# Patient Record
Sex: FEMALE | Race: WHITE | NOT HISPANIC OR LATINO | Employment: UNEMPLOYED | URBAN - METROPOLITAN AREA
[De-identification: names, ages, dates, MRNs, and addresses within clinical notes are randomized per-mention and may not be internally consistent; named-entity substitution may affect disease eponyms.]

---

## 2021-04-26 ENCOUNTER — TELEPHONE (OUTPATIENT)
Dept: OBSTETRICS AND GYNECOLOGY | Facility: CLINIC | Age: 61
End: 2021-04-26

## 2021-05-12 ENCOUNTER — OFFICE VISIT (OUTPATIENT)
Dept: PRIMARY CARE CLINIC | Facility: CLINIC | Age: 61
End: 2021-05-12
Payer: MEDICAID

## 2021-05-12 VITALS
HEIGHT: 64 IN | WEIGHT: 196.25 LBS | HEART RATE: 62 BPM | DIASTOLIC BLOOD PRESSURE: 88 MMHG | SYSTOLIC BLOOD PRESSURE: 128 MMHG | OXYGEN SATURATION: 99 % | BODY MASS INDEX: 33.51 KG/M2 | TEMPERATURE: 98 F

## 2021-05-12 DIAGNOSIS — K21.9 GASTROESOPHAGEAL REFLUX DISEASE, UNSPECIFIED WHETHER ESOPHAGITIS PRESENT: ICD-10-CM

## 2021-05-12 DIAGNOSIS — H91.93 BILATERAL HEARING LOSS, UNSPECIFIED HEARING LOSS TYPE: ICD-10-CM

## 2021-05-12 DIAGNOSIS — Z12.39 ENCOUNTER FOR SCREENING FOR MALIGNANT NEOPLASM OF BREAST, UNSPECIFIED SCREENING MODALITY: ICD-10-CM

## 2021-05-12 DIAGNOSIS — R09.81 SINUS CONGESTION: Primary | ICD-10-CM

## 2021-05-12 PROCEDURE — 99203 PR OFFICE/OUTPT VISIT, NEW, LEVL III, 30-44 MIN: ICD-10-PCS | Mod: S$GLB,,, | Performed by: INTERNAL MEDICINE

## 2021-05-12 PROCEDURE — 99203 OFFICE O/P NEW LOW 30 MIN: CPT | Mod: S$GLB,,, | Performed by: INTERNAL MEDICINE

## 2021-05-12 RX ORDER — AMOXICILLIN AND CLAVULANATE POTASSIUM 875; 125 MG/1; MG/1
1 TABLET, FILM COATED ORAL EVERY 12 HOURS
Qty: 14 TABLET | Refills: 0 | Status: SHIPPED | OUTPATIENT
Start: 2021-05-12 | End: 2021-05-19

## 2021-05-12 RX ORDER — FLUTICASONE PROPIONATE 50 MCG
1 SPRAY, SUSPENSION (ML) NASAL DAILY
Qty: 16 G | Refills: 0 | Status: SHIPPED | OUTPATIENT
Start: 2021-05-12 | End: 2021-06-09

## 2021-05-12 RX ORDER — HYDROCORTISONE AND ACETIC ACID 20.75; 10.375 MG/ML; MG/ML
5 SOLUTION AURICULAR (OTIC) 2 TIMES DAILY
Qty: 10 ML | Refills: 0 | Status: SHIPPED | OUTPATIENT
Start: 2021-05-12 | End: 2021-05-22

## 2021-05-12 RX ORDER — OMEPRAZOLE 40 MG/1
40 CAPSULE, DELAYED RELEASE ORAL DAILY
Qty: 30 CAPSULE | Refills: 2 | Status: SHIPPED | OUTPATIENT
Start: 2021-05-12 | End: 2021-08-10

## 2021-05-27 ENCOUNTER — OFFICE VISIT (OUTPATIENT)
Dept: PRIMARY CARE CLINIC | Facility: CLINIC | Age: 61
End: 2021-05-27
Payer: MEDICAID

## 2021-05-27 VITALS
DIASTOLIC BLOOD PRESSURE: 88 MMHG | BODY MASS INDEX: 33.51 KG/M2 | SYSTOLIC BLOOD PRESSURE: 130 MMHG | OXYGEN SATURATION: 96 % | TEMPERATURE: 99 F | HEIGHT: 64 IN | WEIGHT: 196.25 LBS | HEART RATE: 76 BPM

## 2021-05-27 DIAGNOSIS — Z78.0 POST-MENOPAUSAL: ICD-10-CM

## 2021-05-27 DIAGNOSIS — E78.5 HYPERLIPIDEMIA, UNSPECIFIED HYPERLIPIDEMIA TYPE: Primary | ICD-10-CM

## 2021-05-27 DIAGNOSIS — Z12.11 SCREENING FOR COLON CANCER: ICD-10-CM

## 2021-05-27 DIAGNOSIS — Z00.00 WELLNESS EXAMINATION: ICD-10-CM

## 2021-05-27 PROCEDURE — 99214 OFFICE O/P EST MOD 30 MIN: CPT | Mod: S$GLB,,, | Performed by: INTERNAL MEDICINE

## 2021-05-27 PROCEDURE — 99214 PR OFFICE/OUTPT VISIT, EST, LEVL IV, 30-39 MIN: ICD-10-PCS | Mod: S$GLB,,, | Performed by: INTERNAL MEDICINE

## 2021-05-28 LAB
BUN SERPL-MCNC: 13 MG/DL (ref 7–25)
BUN/CREAT SERPL: NORMAL (CALC) (ref 6–22)
CALCIUM SERPL-MCNC: 9.6 MG/DL (ref 8.6–10.4)
CHLORIDE SERPL-SCNC: 100 MMOL/L (ref 98–110)
CHOLEST SERPL-MCNC: 254 MG/DL
CHOLEST/HDLC SERPL: 5.5 (CALC)
CO2 SERPL-SCNC: 28 MMOL/L (ref 20–32)
CREAT SERPL-MCNC: 0.95 MG/DL (ref 0.5–0.99)
GLUCOSE SERPL-MCNC: 92 MG/DL (ref 65–99)
HDLC SERPL-MCNC: 46 MG/DL
LDLC SERPL CALC-MCNC: 176 MG/DL (CALC)
NONHDLC SERPL-MCNC: 208 MG/DL (CALC)
POTASSIUM SERPL-SCNC: 4.9 MMOL/L (ref 3.5–5.3)
SODIUM SERPL-SCNC: 141 MMOL/L (ref 135–146)
TRIGL SERPL-MCNC: 168 MG/DL

## 2021-05-31 RX ORDER — ATORVASTATIN CALCIUM 40 MG/1
40 TABLET, FILM COATED ORAL DAILY
Qty: 90 TABLET | Refills: 3 | Status: SHIPPED | OUTPATIENT
Start: 2021-05-31 | End: 2022-05-31

## 2021-07-07 ENCOUNTER — TELEPHONE (OUTPATIENT)
Dept: PRIMARY CARE CLINIC | Facility: CLINIC | Age: 61
End: 2021-07-07

## 2021-07-20 ENCOUNTER — TELEPHONE (OUTPATIENT)
Dept: FAMILY MEDICINE | Facility: CLINIC | Age: 61
End: 2021-07-20

## 2021-07-27 ENCOUNTER — TELEPHONE (OUTPATIENT)
Dept: PRIMARY CARE CLINIC | Facility: CLINIC | Age: 61
End: 2021-07-27

## 2021-10-20 LAB — BCS RECOMMENDATION EXT: NORMAL

## 2022-03-08 ENCOUNTER — PATIENT OUTREACH (OUTPATIENT)
Dept: ADMINISTRATIVE | Facility: HOSPITAL | Age: 62
End: 2022-03-08
Payer: MEDICAID

## 2024-06-22 ENCOUNTER — HOSPITAL ENCOUNTER (INPATIENT)
Facility: HOSPITAL | Age: 64
LOS: 3 days | Discharge: HOME | DRG: 328 | End: 2024-06-25
Attending: EMERGENCY MEDICINE | Admitting: SURGERY
Payer: MEDICARE

## 2024-06-22 DIAGNOSIS — K31.89 GASTRIC VOLVULUS: Primary | ICD-10-CM

## 2024-06-22 DIAGNOSIS — Z01.818 PREOPERATIVE CLEARANCE: ICD-10-CM

## 2024-06-22 DIAGNOSIS — K44.9 HIATAL HERNIA: ICD-10-CM

## 2024-06-22 LAB
ABORH RETYPE: NORMAL
ALBUMIN SERPL-MCNC: 3.4 G/DL (ref 3.4–4.8)
ALBUMIN/GLOB SERPL: 1 RATIO (ref 1.1–2)
ALP SERPL-CCNC: 73 UNIT/L (ref 40–150)
ALT SERPL-CCNC: 10 UNIT/L (ref 0–55)
ANION GAP SERPL CALC-SCNC: 9 MEQ/L
APTT PPP: 24.3 SECONDS (ref 23.2–33.7)
AST SERPL-CCNC: 14 UNIT/L (ref 5–34)
BASOPHILS # BLD AUTO: 0.02 X10(3)/MCL
BASOPHILS NFR BLD AUTO: 0.3 %
BILIRUB SERPL-MCNC: 0.4 MG/DL
BUN SERPL-MCNC: 11.2 MG/DL (ref 9.8–20.1)
CALCIUM SERPL-MCNC: 8.3 MG/DL (ref 8.4–10.2)
CHLORIDE SERPL-SCNC: 106 MMOL/L (ref 98–107)
CO2 SERPL-SCNC: 22 MMOL/L (ref 23–31)
CREAT SERPL-MCNC: 0.86 MG/DL (ref 0.55–1.02)
CREAT/UREA NIT SERPL: 13
EOSINOPHIL # BLD AUTO: 0.16 X10(3)/MCL (ref 0–0.9)
EOSINOPHIL NFR BLD AUTO: 2.1 %
ERYTHROCYTE [DISTWIDTH] IN BLOOD BY AUTOMATED COUNT: 13.6 % (ref 11.5–17)
GFR SERPLBLD CREATININE-BSD FMLA CKD-EPI: >60 ML/MIN/1.73/M2
GLOBULIN SER-MCNC: 3.5 GM/DL (ref 2.4–3.5)
GLUCOSE SERPL-MCNC: 90 MG/DL (ref 82–115)
GROUP & RH: NORMAL
HCT VFR BLD AUTO: 40 % (ref 37–47)
HGB BLD-MCNC: 13 G/DL (ref 12–16)
IMM GRANULOCYTES # BLD AUTO: 0.02 X10(3)/MCL (ref 0–0.04)
IMM GRANULOCYTES NFR BLD AUTO: 0.3 %
INDIRECT COOMBS: NORMAL
INR PPP: 1.1
LACTATE SERPL-SCNC: 0.9 MMOL/L (ref 0.5–2.2)
LYMPHOCYTES # BLD AUTO: 2.11 X10(3)/MCL (ref 0.6–4.6)
LYMPHOCYTES NFR BLD AUTO: 28 %
MCH RBC QN AUTO: 27.9 PG (ref 27–31)
MCHC RBC AUTO-ENTMCNC: 32.5 G/DL (ref 33–36)
MCV RBC AUTO: 85.8 FL (ref 80–94)
MONOCYTES # BLD AUTO: 0.51 X10(3)/MCL (ref 0.1–1.3)
MONOCYTES NFR BLD AUTO: 6.8 %
NEUTROPHILS # BLD AUTO: 4.71 X10(3)/MCL (ref 2.1–9.2)
NEUTROPHILS NFR BLD AUTO: 62.5 %
NRBC BLD AUTO-RTO: 0 %
PLATELET # BLD AUTO: 330 X10(3)/MCL (ref 130–400)
PMV BLD AUTO: 10.4 FL (ref 7.4–10.4)
POTASSIUM SERPL-SCNC: 4.5 MMOL/L (ref 3.5–5.1)
PROT SERPL-MCNC: 6.9 GM/DL (ref 5.8–7.6)
PROTHROMBIN TIME: 13.8 SECONDS (ref 12.5–14.5)
RBC # BLD AUTO: 4.66 X10(6)/MCL (ref 4.2–5.4)
SODIUM SERPL-SCNC: 137 MMOL/L (ref 136–145)
SPECIMEN OUTDATE: NORMAL
WBC # BLD AUTO: 7.53 X10(3)/MCL (ref 4.5–11.5)

## 2024-06-22 PROCEDURE — 85025 COMPLETE CBC W/AUTO DIFF WBC: CPT | Performed by: EMERGENCY MEDICINE

## 2024-06-22 PROCEDURE — 86900 BLOOD TYPING SEROLOGIC ABO: CPT | Performed by: EMERGENCY MEDICINE

## 2024-06-22 PROCEDURE — 63600175 PHARM REV CODE 636 W HCPCS: Performed by: EMERGENCY MEDICINE

## 2024-06-22 PROCEDURE — 0DV44ZZ RESTRICTION OF ESOPHAGOGASTRIC JUNCTION, PERCUTANEOUS ENDOSCOPIC APPROACH: ICD-10-PCS | Performed by: SURGERY

## 2024-06-22 PROCEDURE — 93010 ELECTROCARDIOGRAM REPORT: CPT | Mod: ,,, | Performed by: INTERNAL MEDICINE

## 2024-06-22 PROCEDURE — 11000001 HC ACUTE MED/SURG PRIVATE ROOM

## 2024-06-22 PROCEDURE — 0BQT4ZZ REPAIR DIAPHRAGM, PERCUTANEOUS ENDOSCOPIC APPROACH: ICD-10-PCS | Performed by: SURGERY

## 2024-06-22 PROCEDURE — 80053 COMPREHEN METABOLIC PANEL: CPT | Performed by: EMERGENCY MEDICINE

## 2024-06-22 PROCEDURE — 93005 ELECTROCARDIOGRAM TRACING: CPT

## 2024-06-22 PROCEDURE — 86901 BLOOD TYPING SEROLOGIC RH(D): CPT | Performed by: EMERGENCY MEDICINE

## 2024-06-22 PROCEDURE — 83605 ASSAY OF LACTIC ACID: CPT | Performed by: EMERGENCY MEDICINE

## 2024-06-22 PROCEDURE — 85610 PROTHROMBIN TIME: CPT | Performed by: EMERGENCY MEDICINE

## 2024-06-22 PROCEDURE — 96360 HYDRATION IV INFUSION INIT: CPT

## 2024-06-22 PROCEDURE — 99285 EMERGENCY DEPT VISIT HI MDM: CPT | Mod: 25

## 2024-06-22 PROCEDURE — 85730 THROMBOPLASTIN TIME PARTIAL: CPT | Performed by: EMERGENCY MEDICINE

## 2024-06-22 RX ADMIN — SODIUM CHLORIDE, POTASSIUM CHLORIDE, SODIUM LACTATE AND CALCIUM CHLORIDE 1000 ML: 600; 310; 30; 20 INJECTION, SOLUTION INTRAVENOUS at 10:06

## 2024-06-22 NOTE — Clinical Note
Diagnosis: Gastric volvulus [563190]   Future Attending Provider: RD CRUZ [[id number]]   Admit to which facility:: [facility] [[id number]]   Reason for IP Medical Treatment  (Clinical interventions that can only be accomplished in the IP setting? ) :: surgery   I certify that Inpatient services for greater than or equal to 2 midnights are medically necessary:: Yes   Plans for Post-Acute care--if anticipated (pick the single best option):: A. No post acute care anticipated at this time

## 2024-06-23 ENCOUNTER — ANESTHESIA (OUTPATIENT)
Dept: SURGERY | Facility: HOSPITAL | Age: 64
DRG: 328 | End: 2024-06-23
Payer: MEDICARE

## 2024-06-23 ENCOUNTER — ANESTHESIA EVENT (OUTPATIENT)
Dept: SURGERY | Facility: HOSPITAL | Age: 64
DRG: 328 | End: 2024-06-23
Payer: MEDICARE

## 2024-06-23 LAB
ANION GAP SERPL CALC-SCNC: 6 MEQ/L
BASOPHILS # BLD AUTO: 0.02 X10(3)/MCL
BASOPHILS NFR BLD AUTO: 0.4 %
BUN SERPL-MCNC: 10.2 MG/DL (ref 9.8–20.1)
CALCIUM SERPL-MCNC: 8.2 MG/DL (ref 8.4–10.2)
CHLORIDE SERPL-SCNC: 107 MMOL/L (ref 98–107)
CO2 SERPL-SCNC: 25 MMOL/L (ref 23–31)
CREAT SERPL-MCNC: 0.78 MG/DL (ref 0.55–1.02)
CREAT/UREA NIT SERPL: 13
EOSINOPHIL # BLD AUTO: 0.18 X10(3)/MCL (ref 0–0.9)
EOSINOPHIL NFR BLD AUTO: 3.2 %
ERYTHROCYTE [DISTWIDTH] IN BLOOD BY AUTOMATED COUNT: 13.7 % (ref 11.5–17)
GFR SERPLBLD CREATININE-BSD FMLA CKD-EPI: >60 ML/MIN/1.73/M2
GLUCOSE SERPL-MCNC: 90 MG/DL (ref 82–115)
HCT VFR BLD AUTO: 37.9 % (ref 37–47)
HGB BLD-MCNC: 12.7 G/DL (ref 12–16)
IMM GRANULOCYTES # BLD AUTO: 0.01 X10(3)/MCL (ref 0–0.04)
IMM GRANULOCYTES NFR BLD AUTO: 0.2 %
LYMPHOCYTES # BLD AUTO: 1.68 X10(3)/MCL (ref 0.6–4.6)
LYMPHOCYTES NFR BLD AUTO: 30.1 %
MCH RBC QN AUTO: 27.7 PG (ref 27–31)
MCHC RBC AUTO-ENTMCNC: 33.5 G/DL (ref 33–36)
MCV RBC AUTO: 82.8 FL (ref 80–94)
MONOCYTES # BLD AUTO: 0.5 X10(3)/MCL (ref 0.1–1.3)
MONOCYTES NFR BLD AUTO: 8.9 %
NEUTROPHILS # BLD AUTO: 3.2 X10(3)/MCL (ref 2.1–9.2)
NEUTROPHILS NFR BLD AUTO: 57.2 %
NRBC BLD AUTO-RTO: 0 %
OHS QRS DURATION: 76 MS
OHS QTC CALCULATION: 437 MS
PLATELET # BLD AUTO: 389 X10(3)/MCL (ref 130–400)
PMV BLD AUTO: 9.5 FL (ref 7.4–10.4)
POTASSIUM SERPL-SCNC: 4 MMOL/L (ref 3.5–5.1)
RBC # BLD AUTO: 4.58 X10(6)/MCL (ref 4.2–5.4)
SODIUM SERPL-SCNC: 138 MMOL/L (ref 136–145)
WBC # BLD AUTO: 5.59 X10(3)/MCL (ref 4.5–11.5)

## 2024-06-23 PROCEDURE — 63600175 PHARM REV CODE 636 W HCPCS: Performed by: STUDENT IN AN ORGANIZED HEALTH CARE EDUCATION/TRAINING PROGRAM

## 2024-06-23 PROCEDURE — 63600175 PHARM REV CODE 636 W HCPCS: Performed by: NURSE ANESTHETIST, CERTIFIED REGISTERED

## 2024-06-23 PROCEDURE — 25000003 PHARM REV CODE 250

## 2024-06-23 PROCEDURE — 85025 COMPLETE CBC W/AUTO DIFF WBC: CPT | Performed by: STUDENT IN AN ORGANIZED HEALTH CARE EDUCATION/TRAINING PROGRAM

## 2024-06-23 PROCEDURE — 25000003 PHARM REV CODE 250: Performed by: NURSE ANESTHETIST, CERTIFIED REGISTERED

## 2024-06-23 PROCEDURE — 43280 LAPAROSCOPY FUNDOPLASTY: CPT | Mod: ,,, | Performed by: SURGERY

## 2024-06-23 PROCEDURE — P9047 ALBUMIN (HUMAN), 25%, 50ML: HCPCS | Mod: JZ,JG | Performed by: NURSE ANESTHETIST, CERTIFIED REGISTERED

## 2024-06-23 PROCEDURE — 11000001 HC ACUTE MED/SURG PRIVATE ROOM

## 2024-06-23 PROCEDURE — 80048 BASIC METABOLIC PNL TOTAL CA: CPT | Performed by: STUDENT IN AN ORGANIZED HEALTH CARE EDUCATION/TRAINING PROGRAM

## 2024-06-23 PROCEDURE — 71000033 HC RECOVERY, INTIAL HOUR: Performed by: SURGERY

## 2024-06-23 PROCEDURE — 37000008 HC ANESTHESIA 1ST 15 MINUTES: Performed by: SURGERY

## 2024-06-23 PROCEDURE — 63600175 PHARM REV CODE 636 W HCPCS

## 2024-06-23 PROCEDURE — 63600175 PHARM REV CODE 636 W HCPCS: Mod: JZ,JG | Performed by: SURGERY

## 2024-06-23 PROCEDURE — 36415 COLL VENOUS BLD VENIPUNCTURE: CPT | Performed by: STUDENT IN AN ORGANIZED HEALTH CARE EDUCATION/TRAINING PROGRAM

## 2024-06-23 PROCEDURE — 27000221 HC OXYGEN, UP TO 24 HOURS

## 2024-06-23 PROCEDURE — 36000710: Performed by: SURGERY

## 2024-06-23 PROCEDURE — 37000009 HC ANESTHESIA EA ADD 15 MINS: Performed by: SURGERY

## 2024-06-23 PROCEDURE — 27201423 OPTIME MED/SURG SUP & DEVICES STERILE SUPPLY: Performed by: SURGERY

## 2024-06-23 PROCEDURE — 36000711: Performed by: SURGERY

## 2024-06-23 RX ORDER — PHENYLEPHRINE HCL IN 0.9% NACL 1 MG/10 ML
SYRINGE (ML) INTRAVENOUS
Status: DISCONTINUED | OUTPATIENT
Start: 2024-06-23 | End: 2024-06-23

## 2024-06-23 RX ORDER — ONDANSETRON HYDROCHLORIDE 2 MG/ML
4 INJECTION, SOLUTION INTRAVENOUS EVERY 6 HOURS PRN
Status: DISCONTINUED | OUTPATIENT
Start: 2024-06-23 | End: 2024-06-25 | Stop reason: HOSPADM

## 2024-06-23 RX ORDER — GABAPENTIN 300 MG/1
300 CAPSULE ORAL ONCE
Status: DISCONTINUED | OUTPATIENT
Start: 2024-06-23 | End: 2024-06-25 | Stop reason: HOSPADM

## 2024-06-23 RX ORDER — ROCURONIUM BROMIDE 10 MG/ML
INJECTION, SOLUTION INTRAVENOUS
Status: DISCONTINUED | OUTPATIENT
Start: 2024-06-23 | End: 2024-06-23

## 2024-06-23 RX ORDER — GLYCOPYRROLATE 0.2 MG/ML
INJECTION INTRAMUSCULAR; INTRAVENOUS
Status: DISCONTINUED | OUTPATIENT
Start: 2024-06-23 | End: 2024-06-23

## 2024-06-23 RX ORDER — FAMOTIDINE 10 MG/ML
20 INJECTION INTRAVENOUS ONCE
OUTPATIENT
Start: 2024-06-23

## 2024-06-23 RX ORDER — FENTANYL CITRATE 50 UG/ML
INJECTION, SOLUTION INTRAMUSCULAR; INTRAVENOUS
Status: DISCONTINUED | OUTPATIENT
Start: 2024-06-23 | End: 2024-06-23

## 2024-06-23 RX ORDER — LIDOCAINE HYDROCHLORIDE 10 MG/ML
1 INJECTION, SOLUTION EPIDURAL; INFILTRATION; INTRACAUDAL; PERINEURAL ONCE AS NEEDED
Status: DISCONTINUED | OUTPATIENT
Start: 2024-06-23 | End: 2024-06-25 | Stop reason: HOSPADM

## 2024-06-23 RX ORDER — DEXAMETHASONE SODIUM PHOSPHATE 4 MG/ML
INJECTION, SOLUTION INTRA-ARTICULAR; INTRALESIONAL; INTRAMUSCULAR; INTRAVENOUS; SOFT TISSUE
Status: DISCONTINUED | OUTPATIENT
Start: 2024-06-23 | End: 2024-06-23

## 2024-06-23 RX ORDER — OXYCODONE HYDROCHLORIDE 5 MG/1
5 TABLET ORAL EVERY 6 HOURS PRN
Status: DISCONTINUED | OUTPATIENT
Start: 2024-06-23 | End: 2024-06-25 | Stop reason: HOSPADM

## 2024-06-23 RX ORDER — METHOCARBAMOL 100 MG/ML
1000 INJECTION, SOLUTION INTRAMUSCULAR; INTRAVENOUS EVERY 8 HOURS
Status: DISCONTINUED | OUTPATIENT
Start: 2024-06-23 | End: 2024-06-25

## 2024-06-23 RX ORDER — MIDAZOLAM HYDROCHLORIDE 1 MG/ML
INJECTION INTRAMUSCULAR; INTRAVENOUS
Status: DISCONTINUED | OUTPATIENT
Start: 2024-06-23 | End: 2024-06-23

## 2024-06-23 RX ORDER — OXYCODONE HYDROCHLORIDE 10 MG/1
10 TABLET ORAL EVERY 6 HOURS PRN
Status: DISCONTINUED | OUTPATIENT
Start: 2024-06-23 | End: 2024-06-25 | Stop reason: HOSPADM

## 2024-06-23 RX ORDER — ACETAMINOPHEN 10 MG/ML
INJECTION, SOLUTION INTRAVENOUS
Status: DISCONTINUED | OUTPATIENT
Start: 2024-06-23 | End: 2024-06-23

## 2024-06-23 RX ORDER — LIDOCAINE HYDROCHLORIDE 10 MG/ML
1 INJECTION, SOLUTION EPIDURAL; INFILTRATION; INTRACAUDAL; PERINEURAL ONCE
OUTPATIENT
Start: 2024-06-23 | End: 2024-06-23

## 2024-06-23 RX ORDER — PROPOFOL 10 MG/ML
VIAL (ML) INTRAVENOUS
Status: DISCONTINUED | OUTPATIENT
Start: 2024-06-23 | End: 2024-06-23

## 2024-06-23 RX ORDER — TALC
6 POWDER (GRAM) TOPICAL NIGHTLY PRN
Status: DISCONTINUED | OUTPATIENT
Start: 2024-06-23 | End: 2024-06-25 | Stop reason: HOSPADM

## 2024-06-23 RX ORDER — HYDROCODONE BITARTRATE AND ACETAMINOPHEN 5; 325 MG/1; MG/1
1 TABLET ORAL
Status: DISCONTINUED | OUTPATIENT
Start: 2024-06-23 | End: 2024-06-23

## 2024-06-23 RX ORDER — EPHEDRINE SULFATE 50 MG/ML
INJECTION, SOLUTION INTRAVENOUS
Status: DISCONTINUED | OUTPATIENT
Start: 2024-06-23 | End: 2024-06-23

## 2024-06-23 RX ORDER — HYDROMORPHONE HYDROCHLORIDE 2 MG/ML
INJECTION, SOLUTION INTRAMUSCULAR; INTRAVENOUS; SUBCUTANEOUS
Status: DISCONTINUED | OUTPATIENT
Start: 2024-06-23 | End: 2024-06-23

## 2024-06-23 RX ORDER — FENTANYL CITRATE 50 UG/ML
25 INJECTION, SOLUTION INTRAMUSCULAR; INTRAVENOUS EVERY 5 MIN PRN
Status: DISCONTINUED | OUTPATIENT
Start: 2024-06-23 | End: 2024-06-23 | Stop reason: HOSPADM

## 2024-06-23 RX ORDER — SODIUM CHLORIDE 0.9 % (FLUSH) 0.9 %
10 SYRINGE (ML) INJECTION
Status: DISCONTINUED | OUTPATIENT
Start: 2024-06-23 | End: 2024-06-25 | Stop reason: HOSPADM

## 2024-06-23 RX ORDER — ONDANSETRON HYDROCHLORIDE 2 MG/ML
INJECTION, SOLUTION INTRAMUSCULAR; INTRAVENOUS
Status: DISCONTINUED | OUTPATIENT
Start: 2024-06-23 | End: 2024-06-23

## 2024-06-23 RX ORDER — HEPARIN SODIUM 5000 [USP'U]/ML
5000 INJECTION, SOLUTION INTRAVENOUS; SUBCUTANEOUS
Status: COMPLETED | OUTPATIENT
Start: 2024-06-23 | End: 2024-06-23

## 2024-06-23 RX ORDER — FAMOTIDINE 10 MG/ML
20 INJECTION INTRAVENOUS 2 TIMES DAILY
Status: DISCONTINUED | OUTPATIENT
Start: 2024-06-23 | End: 2024-06-25 | Stop reason: HOSPADM

## 2024-06-23 RX ORDER — ACETAMINOPHEN 500 MG
1000 TABLET ORAL EVERY 8 HOURS
Status: DISCONTINUED | OUTPATIENT
Start: 2024-06-23 | End: 2024-06-25 | Stop reason: HOSPADM

## 2024-06-23 RX ORDER — MORPHINE SULFATE 4 MG/ML
4 INJECTION, SOLUTION INTRAMUSCULAR; INTRAVENOUS EVERY 4 HOURS PRN
Status: DISCONTINUED | OUTPATIENT
Start: 2024-06-23 | End: 2024-06-25 | Stop reason: HOSPADM

## 2024-06-23 RX ORDER — ENOXAPARIN SODIUM 100 MG/ML
40 INJECTION SUBCUTANEOUS EVERY 24 HOURS
Status: DISCONTINUED | OUTPATIENT
Start: 2024-06-24 | End: 2024-06-25 | Stop reason: HOSPADM

## 2024-06-23 RX ORDER — LIDOCAINE HYDROCHLORIDE 20 MG/ML
INJECTION, SOLUTION EPIDURAL; INFILTRATION; INTRACAUDAL; PERINEURAL
Status: DISCONTINUED | OUTPATIENT
Start: 2024-06-23 | End: 2024-06-23

## 2024-06-23 RX ORDER — HYDROMORPHONE HYDROCHLORIDE 2 MG/ML
0.2 INJECTION, SOLUTION INTRAMUSCULAR; INTRAVENOUS; SUBCUTANEOUS EVERY 5 MIN PRN
Status: DISCONTINUED | OUTPATIENT
Start: 2024-06-23 | End: 2024-06-23 | Stop reason: HOSPADM

## 2024-06-23 RX ORDER — DEXTROSE MONOHYDRATE, SODIUM CHLORIDE, AND POTASSIUM CHLORIDE 50; 1.49; 4.5 G/1000ML; G/1000ML; G/1000ML
INJECTION, SOLUTION INTRAVENOUS CONTINUOUS
Status: DISCONTINUED | OUTPATIENT
Start: 2024-06-23 | End: 2024-06-24

## 2024-06-23 RX ORDER — ALBUMIN HUMAN 250 G/1000ML
SOLUTION INTRAVENOUS
Status: DISCONTINUED | OUTPATIENT
Start: 2024-06-23 | End: 2024-06-23

## 2024-06-23 RX ORDER — CEFAZOLIN SODIUM 2 G/50ML
2 SOLUTION INTRAVENOUS
Status: COMPLETED | OUTPATIENT
Start: 2024-06-23 | End: 2024-06-23

## 2024-06-23 RX ORDER — SODIUM CHLORIDE 0.9 % (FLUSH) 0.9 %
10 SYRINGE (ML) INJECTION
Status: DISCONTINUED | OUTPATIENT
Start: 2024-06-23 | End: 2024-06-23 | Stop reason: HOSPADM

## 2024-06-23 RX ORDER — PROMETHAZINE HYDROCHLORIDE 25 MG/ML
25 INJECTION, SOLUTION INTRAMUSCULAR; INTRAVENOUS EVERY 6 HOURS PRN
Status: DISCONTINUED | OUTPATIENT
Start: 2024-06-23 | End: 2024-06-25 | Stop reason: HOSPADM

## 2024-06-23 RX ORDER — BUPIVACAINE HYDROCHLORIDE 2.5 MG/ML
INJECTION, SOLUTION EPIDURAL; INFILTRATION; INTRACAUDAL
Status: DISCONTINUED | OUTPATIENT
Start: 2024-06-23 | End: 2024-06-23 | Stop reason: HOSPADM

## 2024-06-23 RX ADMIN — ONDANSETRON 4 MG: 2 INJECTION INTRAMUSCULAR; INTRAVENOUS at 09:06

## 2024-06-23 RX ADMIN — MORPHINE SULFATE 4 MG: 4 INJECTION, SOLUTION INTRAMUSCULAR; INTRAVENOUS at 11:06

## 2024-06-23 RX ADMIN — PROPOFOL 100 MG: 10 INJECTION, EMULSION INTRAVENOUS at 07:06

## 2024-06-23 RX ADMIN — HYDROMORPHONE HYDROCHLORIDE 0.5 MG: 2 INJECTION, SOLUTION INTRAMUSCULAR; INTRAVENOUS; SUBCUTANEOUS at 10:06

## 2024-06-23 RX ADMIN — SUGAMMADEX 200 MG: 100 INJECTION, SOLUTION INTRAVENOUS at 10:06

## 2024-06-23 RX ADMIN — EPHEDRINE SULFATE 5 MG: 50 INJECTION INTRAVENOUS at 08:06

## 2024-06-23 RX ADMIN — MIDAZOLAM HYDROCHLORIDE 1 MG: 1 INJECTION, SOLUTION INTRAMUSCULAR; INTRAVENOUS at 07:06

## 2024-06-23 RX ADMIN — ALBUMIN (HUMAN) 100 ML: 12.5 SOLUTION INTRAVENOUS at 09:06

## 2024-06-23 RX ADMIN — ROCURONIUM BROMIDE 50 MG: 10 SOLUTION INTRAVENOUS at 07:06

## 2024-06-23 RX ADMIN — SODIUM CHLORIDE, SODIUM GLUCONATE, SODIUM ACETATE, POTASSIUM CHLORIDE AND MAGNESIUM CHLORIDE: 526; 502; 368; 37; 30 INJECTION, SOLUTION INTRAVENOUS at 09:06

## 2024-06-23 RX ADMIN — ACETAMINOPHEN 1000 MG: 500 TABLET ORAL at 01:06

## 2024-06-23 RX ADMIN — POTASSIUM CHLORIDE, DEXTROSE MONOHYDRATE AND SODIUM CHLORIDE: 150; 5; 450 INJECTION, SOLUTION INTRAVENOUS at 05:06

## 2024-06-23 RX ADMIN — FENTANYL CITRATE 50 MCG: 50 INJECTION, SOLUTION INTRAMUSCULAR; INTRAVENOUS at 08:06

## 2024-06-23 RX ADMIN — ROCURONIUM BROMIDE 20 MG: 10 SOLUTION INTRAVENOUS at 09:06

## 2024-06-23 RX ADMIN — METHOCARBAMOL 1000 MG: 100 INJECTION INTRAMUSCULAR; INTRAVENOUS at 09:06

## 2024-06-23 RX ADMIN — HEPARIN SODIUM 5000 UNITS: 5000 INJECTION, SOLUTION INTRAVENOUS; SUBCUTANEOUS at 07:06

## 2024-06-23 RX ADMIN — DEXAMETHASONE SODIUM PHOSPHATE 8 MG: 4 INJECTION, SOLUTION INTRA-ARTICULAR; INTRALESIONAL; INTRAMUSCULAR; INTRAVENOUS; SOFT TISSUE at 08:06

## 2024-06-23 RX ADMIN — Medication 100 MCG: at 09:06

## 2024-06-23 RX ADMIN — FAMOTIDINE 20 MG: 10 INJECTION, SOLUTION INTRAVENOUS at 12:06

## 2024-06-23 RX ADMIN — CEFAZOLIN SODIUM 2 G: 2 SOLUTION INTRAVENOUS at 07:06

## 2024-06-23 RX ADMIN — ACETAMINOPHEN 1000 MG: 500 TABLET ORAL at 09:06

## 2024-06-23 RX ADMIN — PHENYLEPHRINE HYDROCHLORIDE 25 MCG/MIN: 10 INJECTION INTRAVENOUS at 09:06

## 2024-06-23 RX ADMIN — Medication 100 MCG: at 08:06

## 2024-06-23 RX ADMIN — GLYCOPYRROLATE 0.2 MG: 0.2 INJECTION INTRAMUSCULAR; INTRAVENOUS at 08:06

## 2024-06-23 RX ADMIN — ACETAMINOPHEN 1000 MG: 10 INJECTION, SOLUTION INTRAVENOUS at 09:06

## 2024-06-23 RX ADMIN — Medication 50 MCG: at 08:06

## 2024-06-23 RX ADMIN — FENTANYL CITRATE 50 MCG: 50 INJECTION, SOLUTION INTRAMUSCULAR; INTRAVENOUS at 07:06

## 2024-06-23 RX ADMIN — Medication 50 MCG: at 09:06

## 2024-06-23 RX ADMIN — LIDOCAINE HYDROCHLORIDE 80 MG: 20 INJECTION, SOLUTION INTRAVENOUS at 07:06

## 2024-06-23 RX ADMIN — ROCURONIUM BROMIDE 20 MG: 10 SOLUTION INTRAVENOUS at 08:06

## 2024-06-23 RX ADMIN — FAMOTIDINE 20 MG: 10 INJECTION, SOLUTION INTRAVENOUS at 09:06

## 2024-06-23 RX ADMIN — POTASSIUM CHLORIDE, DEXTROSE MONOHYDRATE AND SODIUM CHLORIDE: 150; 5; 450 INJECTION, SOLUTION INTRAVENOUS at 07:06

## 2024-06-23 RX ADMIN — SODIUM CHLORIDE, SODIUM GLUCONATE, SODIUM ACETATE, POTASSIUM CHLORIDE AND MAGNESIUM CHLORIDE: 526; 502; 368; 37; 30 INJECTION, SOLUTION INTRAVENOUS at 07:06

## 2024-06-23 NOTE — ED NOTES
Received call from Chemistry techTony, at 2201 w/ critical lactic acid value of 4.3. Notified Dr. Savage at 2201 w/ orders to begin lactated ringers bolus. Lactic acid resulted in computer at 2209 showing lactic acid level of 0.9. Called chemistry to re-verify lactic acid level of 4.3. Tony , then notified RN that lactic acid was indeed 0.9. Notified Dr. Savage.

## 2024-06-23 NOTE — TRANSFER OF CARE
"Anesthesia Transfer of Care Note    Patient: Carmen Regalado    Procedure(s) Performed: Procedure(s) (LRB):  REPAIR, HERNIA, HIATAL, LAPAROSCOPIC (N/A)  FUNDOPLICATION, NISSEN, LAPAROSCOPIC (N/A)    Patient location: PACU    Anesthesia Type: general    Transport from OR: Transported from OR on room air with adequate spontaneous ventilation    Post pain: adequate analgesia    Post assessment: no apparent anesthetic complications and tolerated procedure well    Post vital signs: stable    Level of consciousness: sedated    Nausea/Vomiting: no nausea/vomiting    Complications: none    Transfer of care protocol was followed      Last vitals: Visit Vitals  /76   Pulse 66   Temp 36.6 °C (97.8 °F)   Resp 18   Ht 5' 2" (1.575 m)   Wt 71.7 kg (158 lb)   SpO2 (!) 93%   BMI 28.90 kg/m²     "

## 2024-06-23 NOTE — ANESTHESIA PROCEDURE NOTES
Intubation    Date/Time: 6/23/2024 7:59 AM    Performed by: Lashawn Santos CRNA  Authorized by: Olman Martínez DO    Intubation:     Induction:  Rapid sequence induction    Intubated:  Postinduction    Mask Ventilation:  N/a    Attempts:  1    Attempted By:  CRNA    Method of Intubation:  Direct    Blade:  Munroe 2    Laryngeal View Grade: Grade I - full view of cords      Difficult Airway Encountered?: No      Complications:  None    Airway Device:  Oral endotracheal tube    Airway Device Size:  7.0    Style/Cuff Inflation:  Cuffed (inflated to minimal occlusive pressure)    Tube secured:  22    Secured at:  The lips    Placement Verified By:  Capnometry    Complicating Factors:  None    Findings Post-Intubation:  BS equal bilateral and atraumatic/condition of teeth unchanged  Notes:      NGT to suction prior to induction

## 2024-06-23 NOTE — PROGRESS NOTES
"   Acute Care Surgery   Progress Note  Admit Date: 6/22/2024  HD#1  POD#Day of Surgery    Subjective:   Interval history:  Reports some abdominal discomfort this AM  NPO, denies nausea or vomiting  No bM/flatus  Voiding  OOB    Home Meds:  Current Outpatient Medications   Medication Instructions    atorvastatin (LIPITOR) 40 mg, Oral, Daily    fluticasone propionate (FLONASE) 50 mcg/actuation nasal spray SHAKE LIQUID AND USE 1 SPRAY(50 MCG) IN EACH NOSTRIL EVERY DAY    omeprazole (PRILOSEC) 40 mg, Oral, Daily      Scheduled Meds:   ceFAZolin (Ancef) IV (PEDS and ADULTS)  2 g Intravenous Once Pre-Op    [START ON 6/24/2024] enoxparin  40 mg Subcutaneous Daily    heparin (porcine)  5,000 Units Subcutaneous Once Pre-Op     Continuous Infusions:   dextrose 5 % and 0.45 % NaCl with KCl 20 mEq   Intravenous Continuous         PRN Meds:  Current Facility-Administered Medications:     LIDOcaine (PF) 10 mg/ml (1%), 1 mL, Intradermal, Once PRN    melatonin, 6 mg, Oral, Nightly PRN    ondansetron, 4 mg, Intravenous, Q6H PRN    sodium chloride 0.9%, 10 mL, Intravenous, PRN     Objective:     VITAL SIGNS: 24 HR MIN & MAX LAST   Temp  Min: 97.2 °F (36.2 °C)  Max: 97.8 °F (36.6 °C)  97.8 °F (36.6 °C)   BP  Min: 103/68  Max: 127/60  115/69    Pulse  Min: 54  Max: 71  71    Resp  Min: 16  Max: 20  20    SpO2  Min: 92 %  Max: 100 %  (!) 92 %      HT: 5' 2" (157.5 cm)  WT: 71.7 kg (158 lb)  BMI: 28.9     Intake/output:  Intake/Output - Last 3 Shifts         06/21 0700  06/22 0659 06/22 0700 06/23 0659    IV Piggyback  1000    Total Intake(mL/kg)  1000 (13.9)    Net  +1000                  Intake/Output Summary (Last 24 hours) at 6/23/2024 0650  Last data filed at 6/22/2024 2335  Gross per 24 hour   Intake 1000 ml   Output --   Net 1000 ml           Lines/drains/airway:       Peripheral IV - Single Lumen 06/22/24 20 G Anterior;Right Forearm (Active)   Site Assessment Clean;Dry;Intact;No redness;No swelling 06/23/24 0400   Extremity " "Assessment Distal to IV No abnormal discoloration;No redness;No swelling 06/22/24 2122   Line Status Saline locked 06/23/24 0400   Dressing Status Dry;Clean;Intact 06/23/24 0400   Dressing Intervention Integrity maintained 06/23/24 0400   Number of days: 1       Physical examination:  Gen: NAD  CV: RR  Resp: NWOB  Abd: S, mild epigastric tenderness, ND, no guarding or rigidity \  Ext: moving all extremities spontaneously and purposefully  Neuro: alert    Labs:  Renal:  Recent Labs     06/22/24 2138 06/23/24 0521   BUN 11.2 10.2   CREATININE 0.86 0.78     No results for input(s): "LACTIC" in the last 72 hours.  FENGI:  Recent Labs     06/22/24 2138 06/23/24 0521    138   K 4.5 4.0    107   CO2 22* 25   CALCIUM 8.3* 8.2*   ALBUMIN 3.4  --    BILITOT 0.4  --    AST 14  --    ALKPHOS 73  --    ALT 10  --      Heme:  Recent Labs     06/22/24 2138 06/23/24  0521   HGB 13.0 12.7   HCT 40.0 37.9    389   INR 1.1  --      ID:  Recent Labs     06/22/24 2138 06/23/24 0521   WBC 7.53 5.59     CBG:  Recent Labs     06/22/24 2138 06/23/24  0521   GLUCOSE 90 90      Cardiovascular:  No results for input(s): "TROPONINI", "CKTOTAL", "CKMB", "BNP" in the last 168 hours.  I have reviewed all pertinent lab results within the past 24 hours.    Imaging:  Xray Previous   Final Result      CT Previous   Final Result      CT Previous   Final Result      XR Gastric tube check, non-radiologist performed    (Results Pending)      I have reviewed all pertinent imaging results/findings within the past 24 hours.    Micro/Path/Other:  Microbiology Results (last 7 days)       ** No results found for the last 168 hours. **           Pathology Results  (Last 7 days)      None             Assessment & Plan:   64 year old female with known hiatal hernia that presents as transfer from OSH for acute abdominal pain w/ concern for gastric volvulus.   -afebrile, HDS  -labs stable  -Discussed the risks, benefits and alternatives to " surgical intervention with the patient and her daughter. They are amenable to surgical intervention. Consent obtained, will proceed to OR for lap hiatal hernia repair.   -NPO, NGT to suction  -Pain control as needed  -perioperative heparin and ancef ordered    Phoenix Cabello MD  [employer] General Surgery PGY-4

## 2024-06-23 NOTE — DISCHARGE INSTRUCTIONS
Please return to the ER with any worsening of your symptoms. Call your PCP with any further questions or concerns. Follow all diet recommendations and be sure to attend all follow up appointments.

## 2024-06-23 NOTE — ED PROVIDER NOTES
"Encounter Date: 6/22/2024       History     Chief Complaint   Patient presents with    GI Problem     Pt arrives via [facility], Transfer from [place],  for GI Services.      64-year-old female with a history of hiatal hernia presents to the emergency department as transfer from [place] for general surgery evaluation of suspected gastric volvulus.  She reports 9 days of abdominal pain with associated loose stools.  Laboratory studies at the previous facility unremarkable; however, CT abd/pelvis obtained demonstrated:     "...large hiatal hernia. The stomach contained in the stomach [sic] extends superiorly beyond the field-of-view. 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 Loops of bowel are normal in appearance..."     The history is provided by the patient, the EMS personnel and medical records.     Review of patient's allergies indicates:  No Known Allergies  Past Medical History:   Diagnosis Date    COVID-19 08/10/2020    couldn't walk for 8 days    Hiatal hernia      Past Surgical History:   Procedure Laterality Date    HYSTERECTOMY       No family history on file.  Social History     Tobacco Use    Smoking status: Every Day    Smokeless tobacco: Never   Substance Use Topics    Alcohol use: Never    Drug use: Never     Review of Systems   Constitutional:  Negative for fever.   Respiratory:  Negative for shortness of breath.    Gastrointestinal:  Positive for abdominal pain and diarrhea. Negative for vomiting.       Physical Exam     Initial Vitals [06/22/24 2106]   BP Pulse Resp Temp SpO2   103/68 71 18 97.2 °F (36.2 °C) 96 %      MAP       --         Physical Exam    Nursing note and vitals reviewed.  Constitutional: She appears well-developed and well-nourished. No distress.   HENT:   Head: Normocephalic and atraumatic.   Mouth/Throat: Oropharynx is clear and moist.   Eyes: Conjunctivae are normal. Pupils are equal, round, and reactive to light.   Neck: Neck supple.   Normal range of " motion.  Cardiovascular:  Normal rate and regular rhythm.           Pulmonary/Chest: Breath sounds normal. No respiratory distress.   Abdominal: Abdomen is soft. She exhibits no distension. There is abdominal tenderness (mild, upper abdomen). There is no rebound and no guarding.   Musculoskeletal:      Cervical back: Normal range of motion and neck supple.     Neurological: She is alert and oriented to person, place, and time. GCS score is 15. GCS eye subscore is 4. GCS verbal subscore is 5. GCS motor subscore is 6.   Skin: Skin is warm and dry. No rash noted.   Psychiatric: She has a normal mood and affect.         ED Course   Procedures  Labs Reviewed   COMPREHENSIVE METABOLIC PANEL - Abnormal; Notable for the following components:       Result Value    CO2 22 (*)     Calcium 8.3 (*)     Albumin/Globulin Ratio 1.0 (*)     All other components within normal limits   CBC WITH DIFFERENTIAL - Abnormal; Notable for the following components:    MCHC 32.5 (*)     All other components within normal limits   PROTIME-INR - Normal   APTT - Normal   LACTIC ACID, PLASMA - Normal   CBC W/ AUTO DIFFERENTIAL    Narrative:     The following orders were created for panel order CBC auto differential.  Procedure                               Abnormality         Status                     ---------                               -----------         ------                     CBC with Differential[[id number]]       Abnormal            Final result                 Please view results for these tests on the individual orders.   TYPE & SCREEN   ABORH RETYPE          Imaging Results    None          Medications   LIDOcaine (PF) 10 mg/ml (1%) injection 10 mg (has no administration in time range)   sodium chloride 0.9% flush 10 mL (has no administration in time range)   melatonin tablet 6 mg (has no administration in time range)   dextrose 5 % and 0.45 % NaCl with KCl 20 mEq infusion ( Intravenous New Bag 6/23/24 0733)   enoxaparin injection  40 mg (has no administration in time range)   ondansetron injection 4 mg (has no administration in time range)   gabapentin capsule 300 mg (has no administration in time range)   BUPivacaine (PF) 0.25% (2.5 mg/ml) injection (30 mLs Other Given 6/23/24 0901)   lactated ringers bolus 1,000 mL (0 mLs Intravenous Stopped 6/22/24 4035)   heparin (porcine) injection 5,000 Units (5,000 Units Subcutaneous Given 6/23/24 6719)   cefazolin (ANCEF) 2 gram in dextrose 5% 50 mL IVPB (premix) (2 g Intravenous New Bag 6/23/24 2229)     Medical Decision Making  Problems Addressed:  Gastric volvulus: acute illness or injury  Hiatal hernia: acute illness or injury    Amount and/or Complexity of Data Reviewed  Labs: ordered.    Risk  Decision regarding hospitalization.      ED assessment:    Ms. Regalado presented as transfer for general surgery services after diagnosed with gastric volvulus at previous facility.  No nausea or vomiting.  Mild abdominal tenderness.  Hemodynamically stable.      Differential diagnosis (including but not limited to):   Hiatal hernia, gastric volvulus partial bowel obstruction, acute kidney injury, electrolyte derangements, dehydration    ED management:  I reviewed the workup from previous facility.  Laboratory studies unremarkable.  CT scan as summarized above.  Laboratory studies here with normal lactic acid.  Discussed with General surgery who agreed with admission, plan for OR tomorrow.    Amount and/or Complexity of Data Reviewed  Independent historian: EMS   Summary of history:  Reports transported here from [place] for general surgery services with concern for gastric volvulus.  Patient given Ativan prior to departure in flight but was otherwise mentating at baseline prior to takeoff.  External data reviewed: transfer records and prior imaging  Summary of data reviewed:  As summarized in HPI above  Risk and benefits of testing: discussed   Labs: ordered and reviewed      Discussion of management  or test interpretation with external provider(s): discussed with general surgery consultant   Summary of discussion:  Discussed with Dr. Nielsen who evaluated the patient, agrees with admission, OR tomorrow    Risk  Decision regarding hospitalization  Shared decision making     Critical Care  none    I, Zena Savage MD personally performed the history, PE, MDM, and procedures as documented above and agree with the scribe's documentation.              ED Course as of 06/23/24 0911   Sat Jun 22, 2024 2108 Discussed with Dr. Nielsen.  He was aware patient has a arrived.  He was currently in the OR with another case and will evaluate the patient once he is finished. [KS]   2210 Discussed with surgery resident, he will come to bedside for evaluation [KS]      ED Course User Index  [KS] Zena Savage MD                           Clinical Impression:  Final diagnoses:  [Z01.818] Preoperative clearance  [K31.89] Gastric volvulus (Primary)  [K44.9] Hiatal hernia          ED Disposition Condition    Admit Stable                Zena Savage MD  06/23/24 0915

## 2024-06-23 NOTE — H&P
Date of admission: June 22, 2024     Chief complaint:  Large hiatal hernia with gastric volvulus, transferred from outside hospital     History of present illness:  64-year-old female with known hiatal hernia for least the past 3 years, presented to the emergency department in [place] for abdominal pain and discomfort for about 9 days.  The pain became more frequent, she has had no fever or chills no vomiting but does report some nausea and perhaps some diarrhea.  The discomfort became severe enough causing presented to the hospital [place], CT scan was performed large hiatal hernia with gastric volvulus.  Patient was transferred to [facility] due to surgeon unavailability at outside hospital     Patient appears comfortable at bedside, she has been given narcotic pain medicine in his quite somnolent, given partially by patient's daughter who was sitting at her diet    Past medical history:  Smoking disorder    Surgical history hysterectomy    Medications: None    Social history: Patient smokes pack of cigarettes per day, denies illicit drugs     Allergies:  No known allergies     Review of systems:  No headache dizziness, patient was somnolent but was given pain meds     No chest pain shortness of breath or hemoptysis     No wheezing cough or dyspnea     Generalized abdominal pain, improved at present, positive for diarrhea, no vomiting, positive for nausea    Physical exam:  59 heart beats per minute, blood pressure is 103/68 mm Hg     Saturating 100% on room air     Temperature is 36.2° centigrade     Awake and alert x3, somnolent due to pain meds, appears comfortable  Normocephalic atraumatic pupils equal round reactive to light and accommodation   No upper or lower motor or sensory deficits bilaterally  Cranial nerves 2-12 are grossly intact bilaterally  Regular rate and rhythm no rubs murmurs or gallops  Clear to auscultation bilaterally no wheezes rales or rhonchi  Abdomen is soft nontender  nondistended distended bowel sounds present, no rebound tenderness or guarding, no tenderness at all      Assessment and plan: 64-year-old female with large hiatal hernia likely chronic, there was some degree of gastric volvulus, however patient comfortable, no sepsis or abdominal pain or tenderness     The surgery service, placed NG tube to low intermittent suction, I will take her to the operating room tomorrow for laparoscopic reduction of hiatal hernia and possible gastropexy possible fundoplication, possible gastrectomy     I explained to the patient that 1st and foremost, if we are able to perform a definitive hiatal hernia repair she will need some type of fundoplication whether to or Nissen    If this is not technically feasible, we may consider simply gastropexy, if there is necrotic stomach, she will need gastrectomy and this would likely need to be remedied and multiple returned to the operating room

## 2024-06-23 NOTE — BRIEF OP NOTE
[facility] - Periop Services  Surgery Department  Operative Note    SUMMARY     Date of Procedure: 6/23/2024     Procedure: Procedure(s) (LRB):  REPAIR, HERNIA, HIATAL, LAPAROSCOPIC (N/A)  FUNDOPLICATION, NISSEN, LAPAROSCOPIC (N/A)     Surgeons and Role:     * Sky Nielsen MD - Primary    Assisting Surgeon: MG Iwona PGY2    Pre-Operative Diagnosis: Gastric volvulus [K31.89]    Post-Operative Diagnosis: Post-Op Diagnosis Codes:     * Gastric volvulus [K31.89]    Anesthesia: General    Operative Findings (including complications, if any): hiatal hernia     Description of Technical Procedures: Hiatal hernia repair and Nissen Fundoplication     Estimated Blood Loss (EBL): 20cc           Implants: * No implants in log *    Specimens:   Specimen (24h ago, onward)      None                    Condition: Good    Disposition: PACU - hemodynamically stable.    Isabell Medley MD   [employer] General Surgery PGY2

## 2024-06-23 NOTE — NURSING
Nurses Note -- 4 Eyes      6/23/2024   12:45 AM      Skin assessed during: Transfer      [x] No Altered Skin Integrity Present    []Prevention Measures Documented      [] Yes- Altered Skin Integrity Present or Discovered   [] LDA Added if Not in Epic (Describe Wound)   [] New Altered Skin Integrity was Present on Admit and Documented in LDA   [] Wound Image Taken    Wound Care Consulted? No    Attending Nurse:  Stacy Payne RN    Second RN/Staff Member:   Maribel Apodaca RN

## 2024-06-23 NOTE — ANESTHESIA PREPROCEDURE EVALUATION
06/23/2024  Carmen Regalado is a 64 y.o., female.      Pre-op Assessment    I have reviewed the Patient Summary Reports.     I have reviewed the Nursing Notes. I have reviewed the NPO Status.   I have reviewed the Medications.     Review of Systems  Anesthesia Hx:  No problems with previous Anesthesia                Social:  Smoker       Cardiovascular:      Denies Hypertension.   Denies MI.        Denies Angina.  Denies CHF.                                 Pulmonary:    Denies COPD.  Denies Asthma.                    Hepatic/GI:    Hiatal Hernia,  Denies GERD. Denies Liver Disease.  Denies Hepatitis.     Hernia, Hiatal Hernia      Musculoskeletal:     Hiatal hernia              Neurological:    Denies CVA. Denies Neuromuscular Disease.   Denies Seizures.                              Neuromuscular Disease   Endocrine:  Denies Diabetes. Denies Hypothyroidism.  Denies Hyperthyroidism.       Denies Obesity / BMI > 30      Physical Exam  General: Well nourished, Alert, Cooperative and Oriented    Airway:  Mallampati: I   Mouth Opening: Normal  TM Distance: Normal  Tongue: Normal    Dental:  Edentulous        Anesthesia Plan  Type of Anesthesia, risks & benefits discussed:    Anesthesia Type: Gen ETT  Intra-op Monitoring Plan: Standard ASA Monitors  Post Op Pain Control Plan: multimodal analgesia  Induction:  IV  Airway Plan: Video and Direct  Informed Consent: Informed consent signed with the Patient and all parties understand the risks and agree with anesthesia plan.  All questions answered.   ASA Score: 2  Day of Surgery Review of History & Physical: H&P Update referred to the surgeon/provider.    Ready For Surgery From Anesthesia Perspective.     .

## 2024-06-23 NOTE — ANESTHESIA POSTPROCEDURE EVALUATION
Anesthesia Post Evaluation    Patient: Carmen Regalado    Procedure(s) Performed: Procedure(s) (LRB):  REPAIR, HERNIA, HIATAL, LAPAROSCOPIC (N/A)  FUNDOPLICATION, NISSEN, LAPAROSCOPIC (N/A)    Final Anesthesia Type: general      Patient location during evaluation: PACU  Patient participation: Yes- Able to Participate  Level of consciousness: awake and alert  Post-procedure vital signs: reviewed and stable  Pain management: adequate  Airway patency: patent    PONV status at discharge: No PONV  Anesthetic complications: no      Cardiovascular status: blood pressure returned to baseline  Respiratory status: unassisted and spontaneous ventilation  Hydration status: euvolemic  Follow-up needed               Vitals Value Taken Time   /67 06/23/24 1114   Temp 36.5 °C (97.7 °F) 06/23/24 1126   Pulse 74 06/23/24 1126   Resp 15 06/23/24 1115   SpO2 93 % 06/23/24 1415   Vitals shown include unfiled device data.      Event Time   Out of Recovery 06/23/2024 11:17:00         Pain/Josue Score: Pain Rating Prior to Med Admin: 6 (6/23/2024  1:42 PM)  Pain Rating Post Med Admin: 2 (6/23/2024  2:12 PM)  Josue Score: 8 (6/23/2024 11:17 AM)

## 2024-06-24 LAB
ANION GAP SERPL CALC-SCNC: 8 MEQ/L
BASOPHILS # BLD AUTO: 0.01 X10(3)/MCL
BASOPHILS NFR BLD AUTO: 0.1 %
BUN SERPL-MCNC: 8.4 MG/DL (ref 9.8–20.1)
CALCIUM SERPL-MCNC: 8.4 MG/DL (ref 8.4–10.2)
CHLORIDE SERPL-SCNC: 107 MMOL/L (ref 98–107)
CO2 SERPL-SCNC: 24 MMOL/L (ref 23–31)
CREAT SERPL-MCNC: 0.84 MG/DL (ref 0.55–1.02)
CREAT/UREA NIT SERPL: 10
EOSINOPHIL # BLD AUTO: 0 X10(3)/MCL (ref 0–0.9)
EOSINOPHIL NFR BLD AUTO: 0 %
ERYTHROCYTE [DISTWIDTH] IN BLOOD BY AUTOMATED COUNT: 13.4 % (ref 11.5–17)
GFR SERPLBLD CREATININE-BSD FMLA CKD-EPI: >60 ML/MIN/1.73/M2
GLUCOSE SERPL-MCNC: 148 MG/DL (ref 82–115)
HCT VFR BLD AUTO: 34.8 % (ref 37–47)
HGB BLD-MCNC: 11.6 G/DL (ref 12–16)
IMM GRANULOCYTES # BLD AUTO: 0.04 X10(3)/MCL (ref 0–0.04)
IMM GRANULOCYTES NFR BLD AUTO: 0.3 %
LYMPHOCYTES # BLD AUTO: 1.97 X10(3)/MCL (ref 0.6–4.6)
LYMPHOCYTES NFR BLD AUTO: 14.7 %
MAGNESIUM SERPL-MCNC: 2 MG/DL (ref 1.6–2.6)
MCH RBC QN AUTO: 27.8 PG (ref 27–31)
MCHC RBC AUTO-ENTMCNC: 33.3 G/DL (ref 33–36)
MCV RBC AUTO: 83.3 FL (ref 80–94)
MONOCYTES # BLD AUTO: 0.91 X10(3)/MCL (ref 0.1–1.3)
MONOCYTES NFR BLD AUTO: 6.8 %
NEUTROPHILS # BLD AUTO: 10.51 X10(3)/MCL (ref 2.1–9.2)
NEUTROPHILS NFR BLD AUTO: 78.1 %
NRBC BLD AUTO-RTO: 0 %
PHOSPHATE SERPL-MCNC: 2.9 MG/DL (ref 2.3–4.7)
PLATELET # BLD AUTO: 381 X10(3)/MCL (ref 130–400)
PMV BLD AUTO: 10.1 FL (ref 7.4–10.4)
POTASSIUM SERPL-SCNC: 4.3 MMOL/L (ref 3.5–5.1)
RBC # BLD AUTO: 4.18 X10(6)/MCL (ref 4.2–5.4)
SODIUM SERPL-SCNC: 139 MMOL/L (ref 136–145)
WBC # BLD AUTO: 13.44 X10(3)/MCL (ref 4.5–11.5)

## 2024-06-24 PROCEDURE — 99900035 HC TECH TIME PER 15 MIN (STAT)

## 2024-06-24 PROCEDURE — 63600175 PHARM REV CODE 636 W HCPCS

## 2024-06-24 PROCEDURE — 94640 AIRWAY INHALATION TREATMENT: CPT

## 2024-06-24 PROCEDURE — 84100 ASSAY OF PHOSPHORUS: CPT

## 2024-06-24 PROCEDURE — 94760 N-INVAS EAR/PLS OXIMETRY 1: CPT

## 2024-06-24 PROCEDURE — 11000001 HC ACUTE MED/SURG PRIVATE ROOM

## 2024-06-24 PROCEDURE — 25000242 PHARM REV CODE 250 ALT 637 W/ HCPCS: Performed by: SURGERY

## 2024-06-24 PROCEDURE — S4991 NICOTINE PATCH NONLEGEND: HCPCS

## 2024-06-24 PROCEDURE — 25000242 PHARM REV CODE 250 ALT 637 W/ HCPCS

## 2024-06-24 PROCEDURE — 27000221 HC OXYGEN, UP TO 24 HOURS

## 2024-06-24 PROCEDURE — 36415 COLL VENOUS BLD VENIPUNCTURE: CPT | Performed by: STUDENT IN AN ORGANIZED HEALTH CARE EDUCATION/TRAINING PROGRAM

## 2024-06-24 PROCEDURE — 83735 ASSAY OF MAGNESIUM: CPT

## 2024-06-24 PROCEDURE — 63600175 PHARM REV CODE 636 W HCPCS: Performed by: STUDENT IN AN ORGANIZED HEALTH CARE EDUCATION/TRAINING PROGRAM

## 2024-06-24 PROCEDURE — 25000003 PHARM REV CODE 250

## 2024-06-24 PROCEDURE — 80048 BASIC METABOLIC PNL TOTAL CA: CPT | Performed by: STUDENT IN AN ORGANIZED HEALTH CARE EDUCATION/TRAINING PROGRAM

## 2024-06-24 PROCEDURE — 85025 COMPLETE CBC W/AUTO DIFF WBC: CPT | Performed by: STUDENT IN AN ORGANIZED HEALTH CARE EDUCATION/TRAINING PROGRAM

## 2024-06-24 PROCEDURE — 25000003 PHARM REV CODE 250: Performed by: SURGERY

## 2024-06-24 RX ORDER — IPRATROPIUM BROMIDE AND ALBUTEROL SULFATE 2.5; .5 MG/3ML; MG/3ML
3 SOLUTION RESPIRATORY (INHALATION) EVERY 6 HOURS PRN
Status: DISCONTINUED | OUTPATIENT
Start: 2024-06-24 | End: 2024-06-25 | Stop reason: HOSPADM

## 2024-06-24 RX ORDER — MUPIROCIN 20 MG/G
OINTMENT TOPICAL 2 TIMES DAILY
Status: DISCONTINUED | OUTPATIENT
Start: 2024-06-24 | End: 2024-06-25 | Stop reason: HOSPADM

## 2024-06-24 RX ORDER — FLUTICASONE PROPIONATE 50 MCG
2 SPRAY, SUSPENSION (ML) NASAL DAILY
Status: DISCONTINUED | OUTPATIENT
Start: 2024-06-24 | End: 2024-06-25 | Stop reason: HOSPADM

## 2024-06-24 RX ORDER — IBUPROFEN 200 MG
1 TABLET ORAL DAILY
Status: DISCONTINUED | OUTPATIENT
Start: 2024-06-24 | End: 2024-06-25 | Stop reason: HOSPADM

## 2024-06-24 RX ADMIN — MUPIROCIN: 20 OINTMENT TOPICAL at 09:06

## 2024-06-24 RX ADMIN — ACETAMINOPHEN 1000 MG: 500 TABLET ORAL at 05:06

## 2024-06-24 RX ADMIN — FLUTICASONE PROPIONATE 100 MCG: 50 SPRAY, METERED NASAL at 11:06

## 2024-06-24 RX ADMIN — METHOCARBAMOL 1000 MG: 100 INJECTION INTRAMUSCULAR; INTRAVENOUS at 01:06

## 2024-06-24 RX ADMIN — FAMOTIDINE 20 MG: 10 INJECTION, SOLUTION INTRAVENOUS at 09:06

## 2024-06-24 RX ADMIN — POTASSIUM CHLORIDE, DEXTROSE MONOHYDRATE AND SODIUM CHLORIDE: 150; 5; 450 INJECTION, SOLUTION INTRAVENOUS at 03:06

## 2024-06-24 RX ADMIN — METHOCARBAMOL 1000 MG: 100 INJECTION INTRAMUSCULAR; INTRAVENOUS at 09:06

## 2024-06-24 RX ADMIN — ENOXAPARIN SODIUM 40 MG: 40 INJECTION SUBCUTANEOUS at 04:06

## 2024-06-24 RX ADMIN — ACETAMINOPHEN 1000 MG: 500 TABLET ORAL at 09:06

## 2024-06-24 RX ADMIN — IPRATROPIUM BROMIDE AND ALBUTEROL SULFATE 3 ML: .5; 3 SOLUTION RESPIRATORY (INHALATION) at 05:06

## 2024-06-24 RX ADMIN — METHOCARBAMOL 1000 MG: 100 INJECTION INTRAMUSCULAR; INTRAVENOUS at 05:06

## 2024-06-24 RX ADMIN — NICOTINE 1 PATCH: 14 PATCH TRANSDERMAL at 09:06

## 2024-06-24 NOTE — PLAN OF CARE
Problem: Adult Inpatient Plan of Care  Goal: Plan of Care Review  Outcome: Progressing  Flowsheets (Taken 6/24/2024 1525)  Plan of Care Reviewed With: patient  Goal: Patient-Specific Goal (Individualized)  Outcome: Progressing  Goal: Absence of Hospital-Acquired Illness or Injury  Outcome: Progressing  Intervention: Identify and Manage Fall Risk  Flowsheets (Taken 6/24/2024 1525)  Safety Promotion/Fall Prevention:   assistive device/personal item within reach   medications reviewed   nonskid shoes/socks when out of bed   side rails raised x 2  Intervention: Prevent Skin Injury  Flowsheets (Taken 6/24/2024 1525)  Body Position: position changed independently  Skin Protection: incontinence pads utilized  Device Skin Pressure Protection:   absorbent pad utilized/changed   tubing/devices free from skin contact  Intervention: Prevent and Manage VTE (Venous Thromboembolism) Risk  Flowsheets (Taken 6/24/2024 1525)  VTE Prevention/Management:   ambulation promoted   bleeding risk assessed   ROM (active) performed  Intervention: Prevent Infection  Flowsheets (Taken 6/24/2024 1525)  Infection Prevention:   rest/sleep promoted   single patient room provided  Goal: Optimal Comfort and Wellbeing  Outcome: Progressing  Intervention: Monitor Pain and Promote Comfort  Flowsheets (Taken 6/24/2024 1525)  Pain Management Interventions:   care clustered   medication offered   pain management plan reviewed with patient/caregiver   pillow support provided   position adjusted  Intervention: Provide Person-Centered Care  Flowsheets (Taken 6/24/2024 1525)  Trust Relationship/Rapport: care explained  Goal: Readiness for Transition of Care  Outcome: Progressing     Problem: Wound  Goal: Optimal Coping  Outcome: Progressing  Intervention: Support Patient and Family Response  Flowsheets (Taken 6/24/2024 1525)  Supportive Measures: active listening utilized  Goal: Optimal Functional Ability  Outcome: Progressing  Intervention: Optimize  Functional Ability  Flowsheets (Taken 6/24/2024 1525)  Activity Management: Rolling - L1  Activity Assistance Provided: independent  Goal: Absence of Infection Signs and Symptoms  Outcome: Progressing  Intervention: Prevent or Manage Infection  Flowsheets (Taken 6/24/2024 1525)  Infection Management: aseptic technique maintained  Isolation Precautions: precautions maintained  Goal: Improved Oral Intake  Outcome: Progressing  Intervention: Promote and Optimize Oral Intake  Flowsheets (Taken 6/24/2024 1525)  Oral Nutrition Promotion: rest periods promoted  Goal: Optimal Pain Control and Function  Outcome: Progressing  Intervention: Prevent or Manage Pain  Flowsheets (Taken 6/24/2024 1525)  Sleep/Rest Enhancement:   regular sleep/rest pattern promoted   relaxation techniques promoted  Pain Management Interventions:   care clustered   medication offered   pain management plan reviewed with patient/caregiver   pillow support provided   position adjusted  Goal: Skin Health and Integrity  Outcome: Progressing  Intervention: Optimize Skin Protection  Flowsheets (Taken 6/24/2024 1525)  Pressure Reduction Techniques:   frequent weight shift encouraged   weight shift assistance provided  Skin Protection: incontinence pads utilized  Activity Management: Rolling - L1  Head of Bed (HOB) Positioning: HOB at 30-45 degrees  Goal: Optimal Wound Healing  Outcome: Progressing  Intervention: Promote Wound Healing  Flowsheets (Taken 6/24/2024 1525)  Sleep/Rest Enhancement:   regular sleep/rest pattern promoted   relaxation techniques promoted

## 2024-06-24 NOTE — PROGRESS NOTES
"   Acute Care Surgery   Progress Note  Admit Date: 6/22/2024  HD#2  POD#1 Day Post-Op    Subjective:   Interval history:  MACARIO, AF, HDS  Denies n/v  Tolerated CLD without issue  Voiding  OOB    Home Meds:  Current Outpatient Medications   Medication Instructions    atorvastatin (LIPITOR) 40 mg, Oral, Daily    fluticasone propionate (FLONASE) 50 mcg/actuation nasal spray SHAKE LIQUID AND USE 1 SPRAY(50 MCG) IN EACH NOSTRIL EVERY DAY    omeprazole (PRILOSEC) 40 mg, Oral, Daily      Scheduled Meds:   acetaminophen  1,000 mg Oral Q8H    enoxparin  40 mg Subcutaneous Daily    famotidine (PF)  20 mg Intravenous BID    gabapentin  300 mg Oral Once    methocarbamoL  1,000 mg Intravenous Q8H     Continuous Infusions:   dextrose 5 % and 0.45 % NaCl with KCl 20 mEq   Intravenous Continuous 100 mL/hr at 06/24/24 0329 New Bag at 06/24/24 0329     PRN Meds:  Current Facility-Administered Medications:     LIDOcaine (PF) 10 mg/ml (1%), 1 mL, Intradermal, Once PRN    melatonin, 6 mg, Oral, Nightly PRN    morphine, 4 mg, Intravenous, Q4H PRN    ondansetron, 4 mg, Intravenous, Q6H PRN    oxyCODONE, 5 mg, Oral, Q6H PRN    oxyCODONE, 10 mg, Oral, Q6H PRN    promethazine, 25 mg, Intramuscular, Q6H PRN    sodium chloride 0.9%, 10 mL, Intravenous, PRN     Objective:     VITAL SIGNS: 24 HR MIN & MAX LAST   Temp  Min: 97.4 °F (36.3 °C)  Max: 98.1 °F (36.7 °C)  97.9 °F (36.6 °C)   BP  Min: 98/49  Max: 159/81  (!) 153/81    Pulse  Min: 69  Max: 90  69    Resp  Min: 12  Max: 20  20    SpO2  Min: 93 %  Max: 97 %  (!) 93 %      HT: 5' 2" (157.5 cm)  WT: 71.7 kg (158 lb)  BMI: 28.9     Intake/output:  Intake/Output - Last 3 Shifts         06/22 0700 06/23 0659 06/23 0700 06/24 0659 06/24 0700 06/25 0659    I.V. (mL/kg)  100 (1.4)     IV Piggyback 1000 1300     Total Intake(mL/kg) 1000 (13.9) 1400 (19.5)     Urine (mL/kg/hr)  200 (0.1)     Total Output  200     Net +1000 +1200            Urine Occurrence  4 x             Intake/Output Summary " "(Last 24 hours) at 6/24/2024 0749  Last data filed at 6/23/2024 1100  Gross per 24 hour   Intake 1400 ml   Output 200 ml   Net 1200 ml           Lines/drains/airway:       Peripheral IV - Single Lumen 06/22/24 20 G Anterior;Right Forearm (Active)   Site Assessment Clean;Dry;Intact;No redness;No swelling 06/23/24 0400   Extremity Assessment Distal to IV No abnormal discoloration;No redness;No swelling 06/22/24 2122   Line Status Saline locked 06/23/24 0400   Dressing Status Dry;Clean;Intact 06/23/24 0400   Dressing Intervention Integrity maintained 06/23/24 0400   Number of days: 1       Physical examination:  Gen: NAD  CV: RR  Resp: NWOB  Abd: Soft, appropriately tender, incisions c/d/i  Ext: moving all extremities spontaneously and purposefully  Neuro: alert    Labs:  Renal:  Recent Labs     06/22/24 2138 06/23/24 0521 06/24/24 0342   BUN 11.2 10.2 8.4*   CREATININE 0.86 0.78 0.84     No results for input(s): "LACTIC" in the last 72 hours.  FENGI:  Recent Labs     06/22/24 2138 06/23/24 0521 06/24/24 0342    138 139   K 4.5 4.0 4.3    107 107   CO2 22* 25 24   CALCIUM 8.3* 8.2* 8.4   MG  --   --  2.00   PHOS  --   --  2.9   ALBUMIN 3.4  --   --    BILITOT 0.4  --   --    AST 14  --   --    ALKPHOS 73  --   --    ALT 10  --   --      Heme:  Recent Labs     06/22/24 2138 06/23/24 0521 06/24/24 0342   HGB 13.0 12.7 11.6*   HCT 40.0 37.9 34.8*    389 381   INR 1.1  --   --      ID:  Recent Labs     06/22/24 2138 06/23/24 0521 06/24/24 0342   WBC 7.53 5.59 13.44*     CBG:  Recent Labs     06/22/24 2138 06/23/24 0521 06/24/24 0342   GLUCOSE 90 90 148*      Cardiovascular:  No results for input(s): "TROPONINI", "CKTOTAL", "CKMB", "BNP" in the last 168 hours.  I have reviewed all pertinent lab results within the past 24 hours.    Imaging:  XR Gastric tube check, non-radiologist performed   Final Result      As above.         Electronically signed by: Tony Ford   Date:    06/23/2024 "   Time:    08:29      Xray Previous   Final Result      CT Previous   Final Result      CT Previous   Final Result         I have reviewed all pertinent imaging results/findings within the past 24 hours.    Micro/Path/Other:  Microbiology Results (last 7 days)       ** No results found for the last 168 hours. **           Pathology Results  (Last 7 days)      None             Assessment & Plan:   64 year old female with known hiatal hernia that presents as transfer from OSH for acute abdominal pain w/ concern for gastric volvulus. S/p lap HHR and Nissen 6/23    -Advance to FLD  -Pain control as needed  -H2B  -Lovenox     Possible DC later today on Nissen diet    Isabell Medley MD   [employer] General Surgery PGY2

## 2024-06-24 NOTE — PLAN OF CARE
Problem: Adult Inpatient Plan of Care  Goal: Plan of Care Review  Outcome: Progressing  Flowsheets (Taken 6/24/2024 0425)  Plan of Care Reviewed With: patient  Goal: Patient-Specific Goal (Individualized)  Outcome: Progressing  Goal: Absence of Hospital-Acquired Illness or Injury  Outcome: Progressing  Intervention: Identify and Manage Fall Risk  Flowsheets (Taken 6/24/2024 0425)  Safety Promotion/Fall Prevention:   assistive device/personal item within reach   medications reviewed   nonskid shoes/socks when out of bed  Intervention: Prevent and Manage VTE (Venous Thromboembolism) Risk  Flowsheets (Taken 6/24/2024 0425)  VTE Prevention/Management:   ambulation promoted   bleeding risk assessed   intravenous hydration  Intervention: Prevent Infection  Flowsheets (Taken 6/24/2024 0425)  Infection Prevention:   rest/sleep promoted   hand hygiene promoted   single patient room provided  Goal: Optimal Comfort and Wellbeing  Outcome: Progressing  Intervention: Monitor Pain and Promote Comfort  Flowsheets (Taken 6/24/2024 0425)  Pain Management Interventions:   care clustered   medication offered   pain management plan reviewed with patient/caregiver   pillow support provided  Intervention: Provide Person-Centered Care  Flowsheets (Taken 6/24/2024 0425)  Trust Relationship/Rapport:   empathic listening provided   care explained   reassurance provided   choices provided   questions answered   thoughts/feelings acknowledged   questions encouraged   emotional support provided  Goal: Readiness for Transition of Care  Outcome: Progressing     Problem: Wound  Goal: Optimal Coping  Outcome: Progressing  Intervention: Support Patient and Family Response  Flowsheets (Taken 6/24/2024 0425)  Supportive Measures: active listening utilized  Goal: Optimal Functional Ability  Outcome: Progressing  Goal: Absence of Infection Signs and Symptoms  Outcome: Progressing  Goal: Improved Oral Intake  Outcome: Progressing  Goal: Optimal Pain  Control and Function  Outcome: Progressing  Goal: Skin Health and Integrity  Outcome: Progressing  Intervention: Optimize Skin Protection  Flowsheets (Taken 6/24/2024 0425)  Pressure Reduction Techniques: frequent weight shift encouraged  Activity Management: Ambulated to bathroom - L4  Head of Bed (HOB) Positioning: HOB at 30-45 degrees  Goal: Optimal Wound Healing  Outcome: Progressing  Intervention: Promote Wound Healing  Flowsheets (Taken 6/24/2024 0425)  Sleep/Rest Enhancement: regular sleep/rest pattern promoted

## 2024-06-24 NOTE — PROGRESS NOTES
Inpatient Nutrition Evaluation    Admit Date: 6/22/2024   Total duration of encounter: 2 days    Nutrition Recommendation/Prescription     Diet Full Liquid, advance as medically feasible  Encouraged adequate PO intake  Monitor appetite/PO intake, weight, and labs    Nutrition Assessment     Chart Review    Reason Seen: malnutrition screening tool (MST)    Malnutrition Screening Tool Results   Have you recently lost weight without trying?: Yes: 2-13 lbs  Have you been eating poorly because of a decreased appetite?: Yes   MST Score: 2     Diagnosis:    64 year old female with known hiatal hernia that presents as transfer from OSH for acute abdominal pain w/ concern for gastric volvulus. S/p lap HHR and Nissen 6/23     Relevant Medical History:    COVID-19 08/10/2020     couldn't walk for 8 days    Hiatal hernia        Nutrition-Related Medications:   Scheduled Meds:   acetaminophen  1,000 mg Oral Q8H    enoxparin  40 mg Subcutaneous Daily    famotidine (PF)  20 mg Intravenous BID    fluticasone propionate  2 spray Each Nostril Daily    gabapentin  300 mg Oral Once    methocarbamoL  1,000 mg Intravenous Q8H    mupirocin   Nasal BID    nicotine  1 patch Transdermal Daily     Continuous Infusions:  PRN Meds:.  Current Facility-Administered Medications:     LIDOcaine (PF) 10 mg/ml (1%), 1 mL, Intradermal, Once PRN    melatonin, 6 mg, Oral, Nightly PRN    morphine, 4 mg, Intravenous, Q4H PRN    ondansetron, 4 mg, Intravenous, Q6H PRN    oxyCODONE, 5 mg, Oral, Q6H PRN    oxyCODONE, 10 mg, Oral, Q6H PRN    promethazine, 25 mg, Intramuscular, Q6H PRN    sodium chloride 0.9%, 10 mL, Intravenous, PRN      Nutrition-Related Labs:  Recent Labs   Lab 06/22/24  2138 06/23/24  0521 06/24/24  0342    138 139   K 4.5 4.0 4.3   CALCIUM 8.3* 8.2* 8.4   PHOS  --   --  2.9   MG  --   --  2.00   CO2 22* 25 24   BUN 11.2 10.2 8.4*   CREATININE 0.86 0.78 0.84   EGFRNORACEVR >60 >60 >60   GLUCOSE 90 90 148*   BILITOT 0.4  --   --   "  ALKPHOS 73  --   --    ALT 10  --   --    AST 14  --   --    ALBUMIN 3.4  --   --    WBC 7.53 5.59 13.44*   HGB 13.0 12.7 11.6*   HCT 40.0 37.9 34.8*        Diet Order: Diet Full Liquid  Oral Supplement Order: none  Appetite/Oral Intake: good/25-50% of meals  Factors Affecting Nutritional Intake:  food preferences (does not like full liquid diet)  Food/Zoroastrianism/Cultural Preferences: none reported  Food Allergies: none reported    Skin Integrity: incision  Wound(s):   none noted    Comments    2024: Pt reports a poor appetite/PO intake ~2 weeks prior to admit. Pt reports a good appetite with poor PO intake due to being on a Full Liquid diet. Pt declined ONS. Encouraged adequate PO intake. Pt denies N/V/D/C and chewing/swallowing difficulties. Pt wears dentures but denies problems. Pt reports UBW as 67.2-71.9 kg. Last BM documented as 2024. Will monitor.    Anthropometrics    Height: 5' 2" (157.5 cm)    Last Weight: 71.7 kg (158 lb) (24 0000) Weight Method: Bed Scale  BMI (Calculated): 28.9  BMI Classification: overweight (BMI 25-29.9)     Ideal Body Weight (IBW), Female: 110 lb     % Ideal Body Weight, Female (lb): 143.64 %                    Usual Body Weight (UBW), k.2 kg  % Usual Body Weight: 106.87     Usual Weight Provided By: patient    Wt Readings from Last 5 Encounters:   24 71.7 kg (158 lb)   21 89.4 kg (197 lb)   21 89 kg (196 lb 4 oz)   21 89 kg (196 lb 4 oz)     Weight Change(s) Since Admission:  Admit Weight: 71.7 kg (158 lb) (24 2106)  2024: 71.7 kg    Patient Education    Not applicable.    Monitoring & Evaluation     Dietitian will monitor food and beverage intake, weight, electrolyte/renal panel, glucose/endocrine profile, and gastrointestinal profile.  Nutrition Risk/Follow-Up: low (follow-up in 5-7 days)  Patients assigned 'low nutrition risk' status do not qualify for a full nutritional assessment but will be monitored and re-evaluated in " a 5-7 day time period. Please consult if re-evaluation needed sooner.

## 2024-06-24 NOTE — PLAN OF CARE
06/24/24 1253   Discharge Assessment   Assessment Type Discharge Planning Assessment   Confirmed/corrected address, phone number and insurance Yes   Confirmed Demographics Correct on Facesheet   Source of Information patient   When was your last doctors appointment? 05/27/24   Does patient/caregiver understand observation status Yes   Communicated CHANDAN with patient/caregiver Yes   Reason For Admission gastric volulus   People in Home child(patrick), adult  (adult children live with pt)   Facility Arrived From: home   Do you expect to return to your current living situation? Yes   Do you have help at home or someone to help you manage your care at home? Yes   Who are your caregiver(s) and their phone number(s)? family   Prior to hospitilization cognitive status: Alert/Oriented   Current cognitive status: Alert/Oriented   Walking or Climbing Stairs Difficulty no   Dressing/Bathing Difficulty no   Home Accessibility wheelchair accessible   Equipment Currently Used at Home none   Patient currently being followed by outpatient case management? No   Do you currently have service(s) that help you manage your care at home? No   Do you take prescription medications? Yes   Do you have any problems affording any of your prescribed medications? No   Is the patient taking medications as prescribed? yes   Who is going to help you get home at discharge? family   How do you get to doctors appointments? car, drives self   Are you on dialysis? No   Do you take coumadin? No   Discharge Plan A Home   Discharge Plan B Home Health   DME Needed Upon Discharge  none   Discharge Plan discussed with: Patient;Adult children  (daughter at bs)   Transition of Care Barriers None   Housing Stability   In the last 12 months, was there a time when you were not able to pay the mortgage or rent on time? N   At any time in the past 12 months, were you homeless or living in a shelter (including now)? N   Transportation Needs   Has the lack of  transportation kept you from medical appointments, meetings, work or from getting things needed for daily living? No   Food Insecurity   Within the past 12 months, you worried that your food would run out before you got the money to buy more. Never true   Within the past 12 months, the food you bought just didn't last and you didn't have money to get more. Never true   Utilities   In the past 12 months has the electric, gas, oil, or water company threatened to shut off services in your home? No     Pt reports that her adult children live with her. She does not have hh and does not want Hh set up. She has no DME. She does drive. Other needs TBD.

## 2024-06-25 VITALS
RESPIRATION RATE: 18 BRPM | TEMPERATURE: 98 F | HEIGHT: 62 IN | BODY MASS INDEX: 29.08 KG/M2 | OXYGEN SATURATION: 94 % | HEART RATE: 76 BPM | DIASTOLIC BLOOD PRESSURE: 76 MMHG | SYSTOLIC BLOOD PRESSURE: 139 MMHG | WEIGHT: 158 LBS

## 2024-06-25 PROBLEM — K31.89 GASTRIC VOLVULUS: Status: ACTIVE | Noted: 2024-06-25

## 2024-06-25 PROCEDURE — 25000003 PHARM REV CODE 250

## 2024-06-25 PROCEDURE — 25000003 PHARM REV CODE 250: Performed by: SURGERY

## 2024-06-25 PROCEDURE — 63600175 PHARM REV CODE 636 W HCPCS

## 2024-06-25 PROCEDURE — S4991 NICOTINE PATCH NONLEGEND: HCPCS

## 2024-06-25 RX ORDER — METHOCARBAMOL 500 MG/1
500 TABLET, FILM COATED ORAL 4 TIMES DAILY
Qty: 40 TABLET | Refills: 0 | Status: SHIPPED | OUTPATIENT
Start: 2024-06-25 | End: 2024-07-05

## 2024-06-25 RX ORDER — POLYETHYLENE GLYCOL 3350 17 G/17G
17 POWDER, FOR SOLUTION ORAL DAILY
Status: DISCONTINUED | OUTPATIENT
Start: 2024-06-25 | End: 2024-06-25 | Stop reason: HOSPADM

## 2024-06-25 RX ORDER — METHOCARBAMOL 500 MG/1
500 TABLET, FILM COATED ORAL 4 TIMES DAILY
Status: DISCONTINUED | OUTPATIENT
Start: 2024-06-25 | End: 2024-06-25 | Stop reason: HOSPADM

## 2024-06-25 RX ORDER — OXYCODONE HYDROCHLORIDE 5 MG/1
5 TABLET ORAL EVERY 4 HOURS PRN
Qty: 15 TABLET | Refills: 0 | Status: SHIPPED | OUTPATIENT
Start: 2024-06-25

## 2024-06-25 RX ADMIN — METHOCARBAMOL 1000 MG: 100 INJECTION INTRAMUSCULAR; INTRAVENOUS at 06:06

## 2024-06-25 RX ADMIN — ACETAMINOPHEN 1000 MG: 500 TABLET ORAL at 06:06

## 2024-06-25 RX ADMIN — ACETAMINOPHEN 1000 MG: 500 TABLET ORAL at 01:06

## 2024-06-25 RX ADMIN — FAMOTIDINE 20 MG: 10 INJECTION, SOLUTION INTRAVENOUS at 09:06

## 2024-06-25 RX ADMIN — POLYETHYLENE GLYCOL 3350 17 G: 17 POWDER, FOR SOLUTION ORAL at 09:06

## 2024-06-25 RX ADMIN — NICOTINE 1 PATCH: 14 PATCH TRANSDERMAL at 09:06

## 2024-06-25 RX ADMIN — METHOCARBAMOL 500 MG: 500 TABLET ORAL at 05:06

## 2024-06-25 RX ADMIN — MUPIROCIN: 20 OINTMENT TOPICAL at 09:06

## 2024-06-25 RX ADMIN — FLUTICASONE PROPIONATE 100 MCG: 50 SPRAY, METERED NASAL at 09:06

## 2024-06-25 RX ADMIN — METHOCARBAMOL 500 MG: 500 TABLET ORAL at 01:06

## 2024-06-25 NOTE — PLAN OF CARE
Problem: Adult Inpatient Plan of Care  Goal: Plan of Care Review  Outcome: Progressing  Flowsheets (Taken 6/24/2024 2159)  Plan of Care Reviewed With:   patient   child  Goal: Patient-Specific Goal (Individualized)  Outcome: Progressing  Goal: Absence of Hospital-Acquired Illness or Injury  Outcome: Progressing  Intervention: Identify and Manage Fall Risk  Flowsheets (Taken 6/24/2024 2159)  Safety Promotion/Fall Prevention:   assistive device/personal item within reach   Fall Risk reviewed with patient/family   lighting adjusted   medications reviewed   nonskid shoes/socks when out of bed   side rails raised x 2  Intervention: Prevent Skin Injury  Flowsheets (Taken 6/24/2024 2159)  Body Position:   supine   position changed independently  Skin Protection:   incontinence pads utilized   protective footwear used  Device Skin Pressure Protection:   absorbent pad utilized/changed   tubing/devices free from skin contact  Intervention: Prevent and Manage VTE (Venous Thromboembolism) Risk  Flowsheets (Taken 6/24/2024 2159)  VTE Prevention/Management:   ambulation promoted   bleeding precations maintained   bleeding risk assessed  Intervention: Prevent Infection  Flowsheets (Taken 6/24/2024 2159)  Infection Prevention:   environmental surveillance performed   equipment surfaces disinfected   hand hygiene promoted   personal protective equipment utilized   rest/sleep promoted   single patient room provided  Goal: Optimal Comfort and Wellbeing  Outcome: Progressing  Intervention: Monitor Pain and Promote Comfort  Flowsheets (Taken 6/24/2024 2159)  Pain Management Interventions:   care clustered   quiet environment facilitated   pain management plan reviewed with patient/caregiver  Intervention: Provide Person-Centered Care  Flowsheets (Taken 6/24/2024 2159)  Trust Relationship/Rapport:   care explained   choices provided   emotional support provided   empathic listening provided   questions answered   questions encouraged    reassurance provided   thoughts/feelings acknowledged  Goal: Readiness for Transition of Care  Outcome: Progressing     Problem: Wound  Goal: Optimal Coping  Outcome: Progressing  Intervention: Support Patient and Family Response  Flowsheets (Taken 6/24/2024 2159)  Supportive Measures:   active listening utilized   verbalization of feelings encouraged  Goal: Optimal Functional Ability  Outcome: Progressing  Intervention: Optimize Functional Ability  Flowsheets (Taken 6/24/2024 2159)  Activity Management: Ambulated to bathroom - L4  Activity Assistance Provided: independent  Goal: Absence of Infection Signs and Symptoms  Outcome: Progressing  Intervention: Prevent or Manage Infection  Flowsheets (Taken 6/24/2024 2159)  Infection Management: aseptic technique maintained  Isolation Precautions: precautions initiated  Goal: Improved Oral Intake  Outcome: Progressing  Goal: Optimal Pain Control and Function  Outcome: Progressing  Goal: Skin Health and Integrity  Outcome: Progressing  Goal: Optimal Wound Healing  Outcome: Progressing      I have personally performed a face to face diagnostic evaluation on this patient. I have reviewed the ACP note and agree with the history, exam and plan of care, except as noted.

## 2024-06-25 NOTE — OP NOTE
Date of operation: June 23, 2024     Surgeon:Sky Nielsen MD    CoSurgeon: Isabell mukherjee MD    Operation:  Laparoscopic reduction of gastric volvulus and repair of hiatal hernia with Nissen fundoplication     Indications: This is a 64-year-old female who was transferred from [facility] due to a gastric volvulus hiatal hernia with axial rotation.  There was no surgical coverage available and she was transferred, saw the patient reviewed her radiologic images and agree to take her to the operating room     Preop diagnosis:  Large hiatal hernia with gastric volvulus    Postop diagnosis: Same     Complications: None     Specimens:  None     Findings:  Prior to reduction of gastric volvulus, the entire stomach was within the chest cavity, there was no gastric necrosis, no other organs within the hernia defect     Disposition: Stable satisfactory to PACU     Anesthesia: General endotracheal     Details of operation:  Patient was brought to the operating room laid supine on the operating table, general anesthesia was administered she was intubated endotracheally    The abdomen was prepped draped usual sterile fashion, a periumbilical 11 mm Optiview trocar was placed, pneumoperitoneum was achieved, additional working ports were placed in the right upper quadrant and in the left upper quadrant     Attention was turned towards the epigastrium, a 5 mm stab incision was made in the epigastrium through which a liver retractor was placed, this was used to elevate the liver and anterior direction exposing the stomach and hiatus     The hiatal hernia defect was immediately apparent, it was proximally 7 cm long and 4 cm wide, proximally 100% of the stomach was within the chest cavity     We began by grabbing the stomach generally in reducing it into the abdominal cavity, this was done but it would slingshot back into the chest cavity due to the dense adhesions     This defined the resection borders, we circumferentially  dissected the hernia sac from the hiatal from the hiatus, this was done circumferentially working anterior to the stomach and then ultimately posterior in the retrogastric and retroesophageal space     The sac was satisfactorily taken down, taking care to avoid the aorta and lungs, once the sac was circumferentially dissected, the stomach poor easily was reducible into the abdominal cavity     Ultimately we were able to reduce the stomach into the abdominal cavity with adequate intra abdominal esophageal length, proximally 3-4 cm of intra-abdominal esophagus was obtained without tension     We did not use a bougie we closed the hiatal hernia defect in a retroesophageal fashion, 2 0 Ethibond sutures secured with tie knots in a figure-of-eight fashion was sufficient to close this defect, however it was closed with a posterior fashion, we we did not create out the esophagus     Then the fundus of the stomach was pulled from the patient's left side over to the right side and the shoe shine technique was used to ensure that the wrap was not too tight, the the Nissen fundoplication was performed with 2 0 Ethibond sutures, this also did not press the esophagus, there was still plenty of room, the rapid not too tight     There was no gastric necrosis fortunately     Wells no need to perform a gastropexy     All lap and instrument count was performed, all counts were correct     The ports were removed under direct laparoscopic vision     All skin incisions were closed with 4-0 subcuticular Vicryl sutures and sterile dressings     Patient tolerated procedure well was extubated brought to the PACU in stable condition thank you

## 2024-06-25 NOTE — PROGRESS NOTES
"   Acute Care Surgery   Progress Note  Admit Date: 6/22/2024  HD#3  POD#2 Days Post-Op    Subjective:   Interval history:  MACARIO, HEATHER, HDS  Denies n/v  Tolerated FLD without issue  Voiding  OOB  Ready to go home    Home Meds:  Current Outpatient Medications   Medication Instructions    atorvastatin (LIPITOR) 40 mg, Oral, Daily    fluticasone propionate (FLONASE) 50 mcg/actuation nasal spray SHAKE LIQUID AND USE 1 SPRAY(50 MCG) IN EACH NOSTRIL EVERY DAY    omeprazole (PRILOSEC) 40 mg, Oral, Daily      Scheduled Meds:   acetaminophen  1,000 mg Oral Q8H    enoxparin  40 mg Subcutaneous Daily    famotidine (PF)  20 mg Intravenous BID    fluticasone propionate  2 spray Each Nostril Daily    gabapentin  300 mg Oral Once    methocarbamoL  1,000 mg Intravenous Q8H    mupirocin   Nasal BID    nicotine  1 patch Transdermal Daily     Continuous Infusions:      PRN Meds:  Current Facility-Administered Medications:     albuterol-ipratropium, 3 mL, Nebulization, Q6H PRN    LIDOcaine (PF) 10 mg/ml (1%), 1 mL, Intradermal, Once PRN    melatonin, 6 mg, Oral, Nightly PRN    morphine, 4 mg, Intravenous, Q4H PRN    ondansetron, 4 mg, Intravenous, Q6H PRN    oxyCODONE, 5 mg, Oral, Q6H PRN    oxyCODONE, 10 mg, Oral, Q6H PRN    promethazine, 25 mg, Intramuscular, Q6H PRN    sodium chloride 0.9%, 10 mL, Intravenous, PRN     Objective:     VITAL SIGNS: 24 HR MIN & MAX LAST   Temp  Min: 97.6 °F (36.4 °C)  Max: 99.6 °F (37.6 °C)  97.9 °F (36.6 °C)   BP  Min: 127/68  Max: 157/75  (!) 145/75    Pulse  Min: 63  Max: 98  76    Resp  Min: 18  Max: 20  20    SpO2  Min: 92 %  Max: 95 %  (!) 93 %      HT: 5' 2" (157.5 cm)  WT: 71.7 kg (158 lb)  BMI: 28.9     Intake/output:  Intake/Output - Last 3 Shifts         06/23 0700 06/24 0659 06/24 0700 06/25 0659    P.O.  1870    I.V. (mL/kg) 100 (1.4)     IV Piggyback 1300     Total Intake(mL/kg) 1400 (19.5) 1870 (26.1)    Urine (mL/kg/hr) 200 (0.1)     Total Output 200     Net +1200 +1870          Urine " "Occurrence 4 x 12 x    Stool Occurrence  0 x            Intake/Output Summary (Last 24 hours) at 6/25/2024 0637  Last data filed at 6/25/2024 0423  Gross per 24 hour   Intake 1870 ml   Output --   Net 1870 ml           Lines/drains/airway:       Peripheral IV - Single Lumen 06/22/24 20 G Anterior;Right Forearm (Active)   Site Assessment Clean;Dry;Intact;No redness;No swelling 06/23/24 0400   Extremity Assessment Distal to IV No abnormal discoloration;No redness;No swelling 06/22/24 2122   Line Status Saline locked 06/23/24 0400   Dressing Status Dry;Clean;Intact 06/23/24 0400   Dressing Intervention Integrity maintained 06/23/24 0400   Number of days: 1       Physical examination:  Gen: NAD  CV: RR  Resp: NWOB  Abd: Soft, appropriately tender, incisions c/d/i  Ext: moving all extremities spontaneously and purposefully  Neuro: alert    Labs:  Renal:  Recent Labs     06/22/24 2138 06/23/24 0521 06/24/24 0342   BUN 11.2 10.2 8.4*   CREATININE 0.86 0.78 0.84     No results for input(s): "LACTIC" in the last 72 hours.  FENGI:  Recent Labs     06/22/24 2138 06/23/24 0521 06/24/24 0342    138 139   K 4.5 4.0 4.3    107 107   CO2 22* 25 24   CALCIUM 8.3* 8.2* 8.4   MG  --   --  2.00   PHOS  --   --  2.9   ALBUMIN 3.4  --   --    BILITOT 0.4  --   --    AST 14  --   --    ALKPHOS 73  --   --    ALT 10  --   --      Heme:  Recent Labs     06/22/24 2138 06/23/24 0521 06/24/24 0342   HGB 13.0 12.7 11.6*   HCT 40.0 37.9 34.8*    389 381   INR 1.1  --   --      ID:  Recent Labs     06/22/24 2138 06/23/24 0521 06/24/24 0342   WBC 7.53 5.59 13.44*     CBG:  Recent Labs     06/22/24  2138 06/23/24  0521 06/24/24  0342   GLUCOSE 90 90 148*      Cardiovascular:  No results for input(s): "TROPONINI", "CKTOTAL", "CKMB", "BNP" in the last 168 hours.  I have reviewed all pertinent lab results within the past 24 hours.    Imaging:  XR Gastric tube check, non-radiologist performed   Final Result      As above. "         Electronically signed by: Tony Ford   Date:    06/23/2024   Time:    08:29      Xray Previous   Final Result      CT Previous   Final Result      CT Previous   Final Result         I have reviewed all pertinent imaging results/findings within the past 24 hours.    Micro/Path/Other:  Microbiology Results (last 7 days)       ** No results found for the last 168 hours. **           Pathology Results  (Last 7 days)      None             Assessment & Plan:   64 year old female with known hiatal hernia that presents as transfer from [facility] for acute abdominal pain w/ concern for gastric volvulus. S/p lap HHR and Nissen 6/23    -Cont FLD  -Pain control as needed  -H2B  -Lovenox     Possible DC later today on Nissen diet    Isabell Medley MD   [employer] General Surgery PGY2

## 2024-06-25 NOTE — PLAN OF CARE
06/25/24 0938   Final Note   Assessment Type Final Discharge Note   Anticipated Discharge Disposition Home   Post-Acute Status   Discharge Delays None known at this time     Mayra Ortiz LCSW

## 2024-06-25 NOTE — DISCHARGE SUMMARY
[facility] - Oncology Acute  General Surgery  Discharge Summary      Patient Name: Carmen Regalado  MRN: [id number]  Admission Date: 6/22/2024  Hospital Length of Stay: 3 days  Discharge Date and Time:  06/25/2024 8:37 AM  Attending Physician: Carrington Lanier MD   Discharging Provider: Eloisa Campuzano MD  Primary Care Provider: No primary care provider on file.     HPI: 64-year-old female with known hiatal hernia for least the past 3 years, presented to the emergency department in [place] for abdominal pain and discomfort for about 9 days.  The pain became more frequent, she has had no fever or chills no vomiting but does report some nausea and perhaps some diarrhea.  The discomfort became severe enough causing presented to the hospital [place], CT scan was performed large hiatal hernia with gastric volvulus.  Patient was transferred to [facility] due to surgeon unavailability at outside hospital      Patient appears comfortable at bedside, she has been given narcotic pain medicine in his quite somnolent, given partially by patient's daughter who was sitting at her diet    Procedure(s) (LRB):  REPAIR, HERNIA, HIATAL, LAPAROSCOPIC (N/A)  FUNDOPLICATION, NISSEN, LAPAROSCOPIC (N/A)     Hospital Course: Carmen Regalado is a 64 y.o. female admitted on 6/22/2024. Patient was seen and examined in ED. Clinical presentation suggested gastric volvulus. After risks, benefits and alternatives were discussed patient was scheduled for a laparoscopic hiatal hernia repair and nissen on 6/23 at [facility]. Patient underwent the aforementioned procedure without complication. For further details please refer to the operative report. Over the ensuing hospital course patients clinical condition continued to improve and on 6/25/2024 all discharge criteria had been met and patient was deemed stable enough for discharge.       Consults: none    Significant Diagnostic Studies:   CT outside  hospital         Pending Diagnostic Studies:       None          Final Active Diagnoses:    Diagnosis Date Noted POA    PRINCIPAL PROBLEM:  Gastric volvulus [K31.89] 06/25/2024 Yes      Problems Resolved During this Admission:      Discharged Condition: stable    Disposition: Home or Self Care    Follow Up:   Follow-up Information       Surgery, [facility]. Go on 7/9/2024.    Why: [address]     7/9/24 @ 2852  Contact information:  [address] [place] [address]  [phone]                           Patient Instructions:      Diet Dysphagia Soft   Order Comments: Low residual diet     Lifting restrictions   Order Comments: No lifting > 10lbs for 2 weeks     No dressing needed     Reason for not Ordering Smoking Cessation Referral     Order Specific Question Answer Comments   Reason for not ordering: Patient refused      Reason for not Prescribing Nicotine Replacement     Order Specific Question Answer Comments   Reason for not Prescribing: Patient refused      Activity as tolerated     Medications:  Reconciled Home Medications:      Medication List        START taking these medications      methocarbamoL 500 MG Tab  Commonly known as: ROBAXIN  Take 1 tablet (500 mg total) by mouth 4 (four) times daily. for 10 days     oxyCODONE 5 MG immediate release tablet  Commonly known as: ROXICODONE  Take 1 tablet (5 mg total) by mouth every 4 (four) hours as needed for Pain.            CONTINUE taking these medications      atorvastatin 40 MG tablet  Commonly known as: LIPITOR  Take 1 tablet (40 mg total) by mouth once daily.     omeprazole 40 MG capsule  Commonly known as: PRILOSEC  Take 1 capsule (40 mg total) by mouth once daily.            ASK your doctor about these medications      fluticasone propionate 50 mcg/actuation nasal spray  Commonly known as: FLONASE  SHAKE LIQUID AND USE 1 SPRAY(50 MCG) IN EACH NOSTRIL EVERY DAY              Eloisa Campuzano MD  General Surgery  [facility]  Oncology Acute

## 2024-07-09 ENCOUNTER — OFFICE VISIT (OUTPATIENT)
Dept: SURGERY | Facility: CLINIC | Age: 64
End: 2024-07-09
Payer: MEDICARE

## 2024-07-09 DIAGNOSIS — Z98.890 POST-OPERATIVE STATE: Primary | ICD-10-CM

## 2024-07-09 PROBLEM — K31.89 GASTRIC VOLVULUS: Status: RESOLVED | Noted: 2024-06-25 | Resolved: 2024-07-09

## 2024-07-09 PROCEDURE — 99024 POSTOP FOLLOW-UP VISIT: CPT | Mod: ,,, | Performed by: SURGERY

## 2024-07-09 RX ORDER — OXYCODONE HYDROCHLORIDE 5 MG/1
5 TABLET ORAL EVERY 4 HOURS PRN
Qty: 10 TABLET | Refills: 0 | Status: SHIPPED | OUTPATIENT
Start: 2024-07-09

## 2024-07-09 RX ORDER — METHOCARBAMOL 500 MG/1
500 TABLET, FILM COATED ORAL 4 TIMES DAILY
Qty: 40 TABLET | Refills: 0 | Status: SHIPPED | OUTPATIENT
Start: 2024-07-09 | End: 2024-07-19

## 2024-07-09 NOTE — PROGRESS NOTES
I have reviewed the notes, assessments, and/or procedures performed this visit, and I concur with the documentation.    Doing very well post nissen with lap reduction of hiatal hernia.  F/u prn

## (undated) DEVICE — GOWN X-LG STERILE BACK

## (undated) DEVICE — TOWEL OR DISP STRL BLUE 4/PK

## (undated) DEVICE — SCISSOR CURVED ENDOPATH 5MM

## (undated) DEVICE — KIT GEN LAPAROSCOPY LAFAYETTE

## (undated) DEVICE — SHEARS HS LONG 5MM CURVED

## (undated) DEVICE — SUT VICRYL PLUS 4-0 FS-2 27IN

## (undated) DEVICE — ELECTRODE PATIENT RETURN DISP

## (undated) DEVICE — WARMER DRAPE STERILE LF

## (undated) DEVICE — HOLDER STRIP-T SELF ADH 2X10IN

## (undated) DEVICE — TROCAR ENDOPATH XCEL 11MM 10CM

## (undated) DEVICE — SUT TK QUIK LOAD

## (undated) DEVICE — STRIP MEDI WND CLSR 1/2X4IN

## (undated) DEVICE — SUT SILK 3-0 BLK BR SH 30IN

## (undated) DEVICE — TROCAR ENDOPATH XCEL 5X100MM

## (undated) DEVICE — DRESSING TELFA N ADH 3X8

## (undated) DEVICE — NDL HYPO 22GX1 1/2 SYR 10ML LL

## (undated) DEVICE — SUT ETHIBOND #0 EN-3 112CM

## (undated) DEVICE — SOL NORMAL USPCA 0.9%

## (undated) DEVICE — KIT SURGICAL TURNOVER

## (undated) DEVICE — GLOVE PROTEXIS HYDROGEL SZ7.5

## (undated) DEVICE — COVER MAYO STAND REINFRCD 30

## (undated) DEVICE — SUT CHROMIC LIGATING LOOP

## (undated) DEVICE — GOWN SMARTSLEEVE XXL/XLONG

## (undated) DEVICE — NDL INSUF ULTRA VERESS 120MM

## (undated) DEVICE — CANNULA ENDOPATH XCEL 5X100MM

## (undated) DEVICE — TRAY CATH FOL SIL URIMTR 16FR

## (undated) DEVICE — IRRIGATOR HYDRO-SURG PLUS 5MM

## (undated) DEVICE — SYS HYDRO-SURG IRR SMOKE EVAC

## (undated) DEVICE — LOOP STERION SUPERMAXI 1.3X559

## (undated) DEVICE — CLIP ENDO LIGATION LARGE CLIPS

## (undated) DEVICE — SOL IRRI STRL WATER 1000ML

## (undated) DEVICE — TRAY CATH FOL SIL DRN BAG 16FR

## (undated) DEVICE — DEVICE TK TI-KNOT 5MM REPL DEV

## (undated) DEVICE — TROCAR ENDOPATH XCEL 11X100MM